# Patient Record
Sex: MALE | Race: WHITE | Employment: FULL TIME | URBAN - METROPOLITAN AREA
[De-identification: names, ages, dates, MRNs, and addresses within clinical notes are randomized per-mention and may not be internally consistent; named-entity substitution may affect disease eponyms.]

---

## 2023-03-27 DIAGNOSIS — E11.9 TYPE 2 DIABETES MELLITUS WITHOUT COMPLICATION, WITHOUT LONG-TERM CURRENT USE OF INSULIN (MULTI): Primary | ICD-10-CM

## 2023-03-27 RX ORDER — ORAL SEMAGLUTIDE 7 MG/1
1 TABLET ORAL DAILY
Qty: 90 TABLET | Refills: 3 | Status: SHIPPED | OUTPATIENT
Start: 2023-03-27 | End: 2023-08-07 | Stop reason: SDUPTHER

## 2023-08-07 DIAGNOSIS — E11.9 TYPE 2 DIABETES MELLITUS WITHOUT COMPLICATION, WITHOUT LONG-TERM CURRENT USE OF INSULIN (MULTI): ICD-10-CM

## 2023-08-07 RX ORDER — ORAL SEMAGLUTIDE 7 MG/1
1 TABLET ORAL DAILY
Qty: 90 TABLET | Refills: 0 | Status: SHIPPED | OUTPATIENT
Start: 2023-08-07 | End: 2023-08-15 | Stop reason: SDUPTHER

## 2023-08-15 DIAGNOSIS — E11.9 TYPE 2 DIABETES MELLITUS WITHOUT COMPLICATION, WITHOUT LONG-TERM CURRENT USE OF INSULIN (MULTI): ICD-10-CM

## 2023-08-15 RX ORDER — ORAL SEMAGLUTIDE 7 MG/1
1 TABLET ORAL DAILY
Qty: 90 TABLET | Refills: 1 | Status: SHIPPED | OUTPATIENT
Start: 2023-08-15 | End: 2023-11-20 | Stop reason: SDUPTHER

## 2023-11-20 ENCOUNTER — TELEPHONE (OUTPATIENT)
Dept: PRIMARY CARE | Facility: CLINIC | Age: 53
End: 2023-11-20

## 2023-11-20 DIAGNOSIS — E11.9 TYPE 2 DIABETES MELLITUS WITHOUT COMPLICATION, WITHOUT LONG-TERM CURRENT USE OF INSULIN (MULTI): ICD-10-CM

## 2023-11-20 RX ORDER — ORAL SEMAGLUTIDE 7 MG/1
1 TABLET ORAL DAILY
Qty: 30 TABLET | Refills: 0 | Status: SHIPPED | OUTPATIENT
Start: 2023-11-20 | End: 2023-11-29 | Stop reason: SDUPTHER

## 2023-11-29 ENCOUNTER — OFFICE VISIT (OUTPATIENT)
Dept: PRIMARY CARE | Facility: CLINIC | Age: 53
End: 2023-11-29
Payer: COMMERCIAL

## 2023-11-29 VITALS
HEART RATE: 92 BPM | HEIGHT: 72 IN | SYSTOLIC BLOOD PRESSURE: 142 MMHG | DIASTOLIC BLOOD PRESSURE: 88 MMHG | OXYGEN SATURATION: 96 % | BODY MASS INDEX: 26.28 KG/M2 | WEIGHT: 194 LBS

## 2023-11-29 DIAGNOSIS — R63.4 WEIGHT LOSS: ICD-10-CM

## 2023-11-29 DIAGNOSIS — I10 PRIMARY HYPERTENSION: ICD-10-CM

## 2023-11-29 DIAGNOSIS — Z12.2 ENCOUNTER FOR SCREENING FOR LUNG CANCER: ICD-10-CM

## 2023-11-29 DIAGNOSIS — E55.9 MILD VITAMIN D DEFICIENCY: ICD-10-CM

## 2023-11-29 DIAGNOSIS — E11.69 TYPE 2 DIABETES MELLITUS WITH OTHER SPECIFIED COMPLICATION, WITHOUT LONG-TERM CURRENT USE OF INSULIN (MULTI): ICD-10-CM

## 2023-11-29 DIAGNOSIS — E11.9 TYPE 2 DIABETES MELLITUS WITHOUT COMPLICATION, WITHOUT LONG-TERM CURRENT USE OF INSULIN (MULTI): Primary | ICD-10-CM

## 2023-11-29 DIAGNOSIS — E78.5 DYSLIPIDEMIA: ICD-10-CM

## 2023-11-29 DIAGNOSIS — Z12.11 COLON CANCER SCREENING: ICD-10-CM

## 2023-11-29 PROCEDURE — 3046F HEMOGLOBIN A1C LEVEL >9.0%: CPT | Performed by: STUDENT IN AN ORGANIZED HEALTH CARE EDUCATION/TRAINING PROGRAM

## 2023-11-29 PROCEDURE — 1036F TOBACCO NON-USER: CPT | Performed by: STUDENT IN AN ORGANIZED HEALTH CARE EDUCATION/TRAINING PROGRAM

## 2023-11-29 PROCEDURE — 99214 OFFICE O/P EST MOD 30 MIN: CPT | Performed by: STUDENT IN AN ORGANIZED HEALTH CARE EDUCATION/TRAINING PROGRAM

## 2023-11-29 PROCEDURE — 3077F SYST BP >= 140 MM HG: CPT | Performed by: STUDENT IN AN ORGANIZED HEALTH CARE EDUCATION/TRAINING PROGRAM

## 2023-11-29 PROCEDURE — 3079F DIAST BP 80-89 MM HG: CPT | Performed by: STUDENT IN AN ORGANIZED HEALTH CARE EDUCATION/TRAINING PROGRAM

## 2023-11-29 PROCEDURE — 4010F ACE/ARB THERAPY RXD/TAKEN: CPT | Performed by: STUDENT IN AN ORGANIZED HEALTH CARE EDUCATION/TRAINING PROGRAM

## 2023-11-29 PROCEDURE — 3049F LDL-C 100-129 MG/DL: CPT | Performed by: STUDENT IN AN ORGANIZED HEALTH CARE EDUCATION/TRAINING PROGRAM

## 2023-11-29 RX ORDER — ATORVASTATIN CALCIUM 20 MG/1
TABLET, FILM COATED ORAL
COMMUNITY
Start: 2019-11-05 | End: 2023-11-29 | Stop reason: SDUPTHER

## 2023-11-29 RX ORDER — ATORVASTATIN CALCIUM 20 MG/1
20 TABLET, FILM COATED ORAL DAILY
Qty: 90 TABLET | Refills: 3 | Status: SHIPPED | OUTPATIENT
Start: 2023-11-29 | End: 2023-12-03 | Stop reason: ALTCHOICE

## 2023-11-29 RX ORDER — ORAL SEMAGLUTIDE 7 MG/1
1 TABLET ORAL DAILY
Qty: 30 TABLET | Refills: 0 | Status: SHIPPED | OUTPATIENT
Start: 2023-11-29 | End: 2023-12-03

## 2023-11-29 RX ORDER — LISINOPRIL 5 MG/1
1 TABLET ORAL DAILY
COMMUNITY
Start: 2018-06-12 | End: 2023-11-29 | Stop reason: SDUPTHER

## 2023-11-29 RX ORDER — LISINOPRIL 5 MG/1
5 TABLET ORAL DAILY
Qty: 90 TABLET | Refills: 3 | Status: SHIPPED | OUTPATIENT
Start: 2023-11-29 | End: 2023-12-03 | Stop reason: ALTCHOICE

## 2023-11-29 NOTE — PROGRESS NOTES
Joe Cruz is a 53 y.o. male [place] [facility] [place] Dam[place] Baig on 11/29/23 for routine care, as well as for management of the following chronic medical conditions: HTN, DLD, T2DM. He presents today for overdue T2DM follow up visit.     UPDATES:   Weight loss on GLP-1: around 194lbs from 217 (over 20 pounds)   Rybelsus no longer covered, unfortunately  Hesitant to pursue injectable but discussed and will hopefully try    ROS only otherwise notable for some lower extremity stiffness   Due for updated/fasting labs, ordered today      #T2DM  - ACE-I: Lisinopril 10mg   - Rybelsus and Glimepiride  [  ] transition to injectable GLP-1, specific pending insurance coverage   - Previously on Metformin but intolerant due to GI side effects   - Last A1C 9 in August 2022 (prior to starting Rybelsus), improved to 7.8%; subsequently increased to 9.2% in setting of him rationing Rybelsus recently   [  ] trialed Ozempic, not covered; Trulicity as alternative   [  ] follow up in 3 months   [  ] encouraged annual podiatry and optho evaluations; prior referrals      #DLD  - Atorva 20   - lipid panel today with , unclear compliance  [  ] uptitrate to 40mg daily   [  ] repeat labs with next A1C      #HTN  - elevated in office today on Lisinopril 5  - increase to Lisinopril 10mg daily   [  ] follow up BP at next visit; further uptitration as needed   - CMP 12/2023 with reassuring renal function     Past Medical History: as above   Past Medical History:   Diagnosis Date    Other specified health status     No pertinent past medical history     Subspecialty Medical Care: none    Past Surgical History: prior ENT surgery (deviated septum)   Past Surgical History:   Procedure Laterality Date    OTHER SURGICAL HISTORY  12/05/2019    No history of surgery     Medications:   Current Outpatient Medications:     atorvastatin (Lipitor) 40 mg tablet, Take 1 tablet (40 mg) by mouth once daily., Disp: 90 tablet, Rfl: 3     dulaglutide (Trulicity) 1.5 mg/0.5 mL pen injector injection, Inject 1.5 mg under the skin 1 (one) time per week., Disp: 2 mL, Rfl: 3    lisinopril 10 mg tablet, Take 1 tablet (10 mg) by mouth once daily., Disp: 90 tablet, Rfl: 3    semaglutide 0.25 mg or 0.5 mg (2 mg/3 mL) pen injector, Inject 0.5 mg under the skin 1 (one) time per week., Disp: 3 mL, Rfl: 2  Pharmacy: [facility]    Allergies: PCN (as child; may consider challenge if necessary)   Allergies   Allergen Reactions    Penicillins Other and Unknown     Unknown reaction     Immunizations:   - Tdap 2017-->2027  - Flu UTD 2023  - COVID booster UTD 2023    - Shingrix recommended but denies ever having varicella as child     Family History:   - Mother with COPD, lifelong smoker   - Father with CAD   - Brother with HEENT cancer, smoking/alcohol use   No family history on file.    Social History:   Home/Living Situation/Falls/Safety Assessment: lives alone   Education/Employment/Work/Vocational:   Activities: had been working out regularly in the past   Drug Use: former smoker (quit in 2008, from age [age])  Diet: dietary counseling discussed; mostly eats out   Depression/Anxiety: no mood concerns   Sexuality/Contraception/Menstrual History: defers   Sleep: no sleep concerns, rises at 4:45AM daily      Patient Information:  Health Insurance: has insurance   Transportation: [facility] POA/Guardian: emergency contact, brother [phone]  Contact Information: [phone]    Visit Vitals  /88   Pulse 92   Ht 1.829 m (6')   Wt 88 kg (194 lb)   SpO2 96%   BMI 26.31 kg/m²   Smoking Status Never   BSA 2.11 m²      PHYSICAL EXAM:   General: well appearing  male, NAD, pleasant and engaged in encounter    HEENT: NCAT, MMM  CV: RRR, no m/r/g  PULM: CTAB, non-labored respirations   ABD: soft, NT, ND, + bowel sounds   : no suprapubic or CVA tenderness   EXT: WWP, no significant edema   SKIN: no rashes noted   NEURO: A&Ox4, symmetric facies, no  gross motor or sensory deficits, normal gait  PSYCH: pleasant mood, appropriate affect     Assessment/Plan    Joe Cruz is a 53 y.o. male seen in Clinic at [facility] [place]. Damon Baig on 11/29/23 for routine care, as well as for management of the following chronic medical conditions: HTN, DLD, T2DM. He presents today for overdue T2DM follow up visit.     UPDATES:   Weight loss on GLP-1: around 194lbs from 217 (over 20 pounds)   Rybelsus no longer covered, unfortunately  Hesitant to pursue injectable but discussed and will hopefully try    ROS only otherwise notable for some lower extremity stiffness   Due for updated/fasting labs, ordered today      #T2DM  - ACE-I: Lisinopril 10mg   - Rybelsus and Glimepiride  [  ] transition to injectable GLP-1, specific pending insurance coverage   - Previously on Metformin but intolerant due to GI side effects   - Last A1C 9 in August 2022 (prior to starting Rybelsus), improved to 7.8%; subsequently increased to 9.2% in setting of him rationing Rybelsus recently   [  ] trialed Ozempic, not covered; Trulicity as alternative   [  ] follow up in 3 months   [  ] encouraged annual podiatry and optho evaluations; prior referrals      #DLD  - Atorva 20   - lipid panel today with , unclear compliance  [  ] uptitrate to 40mg daily   [  ] repeat labs with next A1C      #HTN  - elevated in office today on Lisinopril 5  - increase to Lisinopril 10mg daily   [  ] follow up BP at next visit; further uptitration as needed   - CMP 12/2023 with reassuring renal function      #Health Maintenance     Cancer Screening  - Colorectal Cancer Screening: YAMINI recommended and ordered today   - Lung Cancer Screening: ordered today given ~25 pack year smoking history   - Prostate Cancer Screening: labs at 53     Laboratory Screening  - Lipid Screen: labs today (on statin)   - A1C, glucose screen: labs today   - STI, HIV, Hep B screen: defers  - Hep C screen: defers     Imaging  Screening  - AAA screening: due at 65      Immunizations  - Influenza: UTD 2023  - COVID: UTD 2023   - Tdap: UTD through 2027  - Prevnar, Pneumovax:   - Shingrix: recommended      Other Screening  - Health Literacy Assessment: adequate  - Depression screen: NEGATIVE   - Home safety/partner violence screen: NEGATIVE   - Hearing/Vision screens: optho recommended given T2DM   - Alcohol/tobacco/drug use screen: former smoker   - Healthcare POA/Advanced Directives: Brother      Referrals: medication refills (uptitration of Lisinopril and Atorvastatin given not at goal; transition from Rybelsus to Trulicity), labs, Cologuard, CT lung cancer screening    Return to clinic in 3 months for follow-up of suboptimally controlled T2DM, DLD, and HTN.     Patient Discussion:    Please call back the office with any questions at [phone]. In the case of an emergency, please call [phone] or go to the nearest Emergency Department.      Damon Baig MD  Internal Medicine-Pediatrics  [facility] [place] [address], [address]  P: [phone], F: [phone]

## 2023-12-02 ENCOUNTER — LAB (OUTPATIENT)
Dept: LAB | Facility: LAB | Age: 53
End: 2023-12-02
Payer: COMMERCIAL

## 2023-12-02 DIAGNOSIS — E55.9 MILD VITAMIN D DEFICIENCY: ICD-10-CM

## 2023-12-02 DIAGNOSIS — E78.5 DYSLIPIDEMIA: ICD-10-CM

## 2023-12-02 DIAGNOSIS — E11.9 TYPE 2 DIABETES MELLITUS WITHOUT COMPLICATION, WITHOUT LONG-TERM CURRENT USE OF INSULIN (MULTI): ICD-10-CM

## 2023-12-02 DIAGNOSIS — R63.4 WEIGHT LOSS: ICD-10-CM

## 2023-12-02 LAB
25(OH)D3 SERPL-MCNC: 6 NG/ML (ref 30–100)
ALBUMIN SERPL BCP-MCNC: 4.5 G/DL (ref 3.4–5)
ALP SERPL-CCNC: 93 U/L (ref 33–120)
ALT SERPL W P-5'-P-CCNC: 12 U/L (ref 10–52)
ANION GAP SERPL CALC-SCNC: 11 MMOL/L (ref 10–20)
AST SERPL W P-5'-P-CCNC: 9 U/L (ref 9–39)
BASOPHILS # BLD AUTO: 0.06 X10*3/UL (ref 0–0.1)
BASOPHILS NFR BLD AUTO: 0.7 %
BILIRUB SERPL-MCNC: 1.4 MG/DL (ref 0–1.2)
BUN SERPL-MCNC: 13 MG/DL (ref 6–23)
CALCIUM SERPL-MCNC: 9.5 MG/DL (ref 8.6–10.6)
CHLORIDE SERPL-SCNC: 104 MMOL/L (ref 98–107)
CHOLEST SERPL-MCNC: 172 MG/DL (ref 0–199)
CHOLESTEROL/HDL RATIO: 3.9
CO2 SERPL-SCNC: 31 MMOL/L (ref 21–32)
CREAT SERPL-MCNC: 0.75 MG/DL (ref 0.5–1.3)
EOSINOPHIL # BLD AUTO: 0.34 X10*3/UL (ref 0–0.7)
EOSINOPHIL NFR BLD AUTO: 4 %
ERYTHROCYTE [DISTWIDTH] IN BLOOD BY AUTOMATED COUNT: 12.5 % (ref 11.5–14.5)
ERYTHROCYTE [SEDIMENTATION RATE] IN BLOOD BY WESTERGREN METHOD: <1 MM/H (ref 0–20)
EST. AVERAGE GLUCOSE BLD GHB EST-MCNC: 217 MG/DL
FERRITIN SERPL-MCNC: 197 NG/ML (ref 20–300)
FOLATE SERPL-MCNC: 11.2 NG/ML
GFR SERPL CREATININE-BSD FRML MDRD: >90 ML/MIN/1.73M*2
GLUCOSE SERPL-MCNC: 212 MG/DL (ref 74–99)
HBA1C MFR BLD: 9.2 %
HBV SURFACE AG SERPL QL IA: NONREACTIVE
HCT VFR BLD AUTO: 50.6 % (ref 41–52)
HCV AB SER QL: NONREACTIVE
HDLC SERPL-MCNC: 44.4 MG/DL
HGB BLD-MCNC: 17.3 G/DL (ref 13.5–17.5)
HIV 1+2 AB+HIV1 P24 AG SERPL QL IA: NONREACTIVE
IMM GRANULOCYTES # BLD AUTO: 0.02 X10*3/UL (ref 0–0.7)
IMM GRANULOCYTES NFR BLD AUTO: 0.2 % (ref 0–0.9)
IRON SATN MFR SERPL: 32 %
IRON SERPL-MCNC: 104 UG/DL (ref 35–150)
LDLC SERPL CALC-MCNC: 108 MG/DL
LYMPHOCYTES # BLD AUTO: 2.47 X10*3/UL (ref 1.2–4.8)
LYMPHOCYTES NFR BLD AUTO: 29 %
MCH RBC QN AUTO: 29.5 PG (ref 26–34)
MCHC RBC AUTO-ENTMCNC: 34.2 G/DL (ref 32–36)
MCV RBC AUTO: 86 FL (ref 80–100)
MONOCYTES # BLD AUTO: 0.64 X10*3/UL (ref 0.1–1)
MONOCYTES NFR BLD AUTO: 7.5 %
NEUTROPHILS # BLD AUTO: 5 X10*3/UL (ref 1.2–7.7)
NEUTROPHILS NFR BLD AUTO: 58.6 %
NON HDL CHOLESTEROL: 128 MG/DL (ref 0–149)
NRBC BLD-RTO: 0 /100 WBCS (ref 0–0)
PLATELET # BLD AUTO: 275 X10*3/UL (ref 150–450)
POTASSIUM SERPL-SCNC: 4.5 MMOL/L (ref 3.5–5.3)
PROT SERPL-MCNC: 6.9 G/DL (ref 6.4–8.2)
RBC # BLD AUTO: 5.86 X10*6/UL (ref 4.5–5.9)
SODIUM SERPL-SCNC: 141 MMOL/L (ref 136–145)
TIBC SERPL-MCNC: 326 UG/DL
TRIGL SERPL-MCNC: 97 MG/DL (ref 0–149)
TSH SERPL-ACNC: 0.53 MIU/L (ref 0.44–3.98)
UIBC SERPL-MCNC: 222 UG/DL (ref 110–370)
VIT B12 SERPL-MCNC: 298 PG/ML (ref 211–911)
VLDL: 19 MG/DL (ref 0–40)
WBC # BLD AUTO: 8.5 X10*3/UL (ref 4.4–11.3)

## 2023-12-02 PROCEDURE — 85652 RBC SED RATE AUTOMATED: CPT

## 2023-12-02 PROCEDURE — 80053 COMPREHEN METABOLIC PANEL: CPT

## 2023-12-02 PROCEDURE — 83036 HEMOGLOBIN GLYCOSYLATED A1C: CPT

## 2023-12-02 PROCEDURE — 82306 VITAMIN D 25 HYDROXY: CPT

## 2023-12-02 PROCEDURE — 84443 ASSAY THYROID STIM HORMONE: CPT

## 2023-12-02 PROCEDURE — 82746 ASSAY OF FOLIC ACID SERUM: CPT

## 2023-12-02 PROCEDURE — 87340 HEPATITIS B SURFACE AG IA: CPT

## 2023-12-02 PROCEDURE — 82607 VITAMIN B-12: CPT

## 2023-12-02 PROCEDURE — 83540 ASSAY OF IRON: CPT

## 2023-12-02 PROCEDURE — 83550 IRON BINDING TEST: CPT

## 2023-12-02 PROCEDURE — 87389 HIV-1 AG W/HIV-1&-2 AB AG IA: CPT

## 2023-12-02 PROCEDURE — 85025 COMPLETE CBC W/AUTO DIFF WBC: CPT

## 2023-12-02 PROCEDURE — 36415 COLL VENOUS BLD VENIPUNCTURE: CPT

## 2023-12-02 PROCEDURE — 82728 ASSAY OF FERRITIN: CPT

## 2023-12-02 PROCEDURE — 80061 LIPID PANEL: CPT

## 2023-12-02 PROCEDURE — 86803 HEPATITIS C AB TEST: CPT

## 2023-12-03 DIAGNOSIS — I10 PRIMARY HYPERTENSION: ICD-10-CM

## 2023-12-03 DIAGNOSIS — E11.9 TYPE 2 DIABETES MELLITUS WITHOUT COMPLICATION, WITHOUT LONG-TERM CURRENT USE OF INSULIN (MULTI): Primary | ICD-10-CM

## 2023-12-03 DIAGNOSIS — E11.9 TYPE 2 DIABETES MELLITUS WITHOUT COMPLICATION, WITHOUT LONG-TERM CURRENT USE OF INSULIN (MULTI): ICD-10-CM

## 2023-12-03 DIAGNOSIS — E78.5 DYSLIPIDEMIA: Primary | ICD-10-CM

## 2023-12-03 RX ORDER — ATORVASTATIN CALCIUM 40 MG/1
40 TABLET, FILM COATED ORAL DAILY
Qty: 90 TABLET | Refills: 3 | Status: SHIPPED | OUTPATIENT
Start: 2023-12-03 | End: 2023-12-28 | Stop reason: SDUPTHER

## 2023-12-03 RX ORDER — LISINOPRIL 10 MG/1
10 TABLET ORAL DAILY
Qty: 90 TABLET | Refills: 3 | Status: SHIPPED | OUTPATIENT
Start: 2023-12-03 | End: 2023-12-28 | Stop reason: SDUPTHER

## 2023-12-04 NOTE — PROGRESS NOTES
T2DM with worsening control; patient resistant to transitioning to injectable GLP-1 from Rybelsus. Pharmacy referral for ongoing support and discussion.     Damon Baig MD

## 2023-12-05 ENCOUNTER — TELEPHONE (OUTPATIENT)
Dept: PRIMARY CARE | Facility: CLINIC | Age: 53
End: 2023-12-05

## 2023-12-12 DIAGNOSIS — E11.9 TYPE 2 DIABETES MELLITUS WITHOUT COMPLICATION, WITHOUT LONG-TERM CURRENT USE OF INSULIN (MULTI): Primary | ICD-10-CM

## 2023-12-13 RX ORDER — DULAGLUTIDE 1.5 MG/.5ML
1.5 INJECTION, SOLUTION SUBCUTANEOUS
Qty: 2 ML | Refills: 11 | Status: SHIPPED | OUTPATIENT
Start: 2023-12-13 | End: 2023-12-15 | Stop reason: SDUPTHER

## 2023-12-15 DIAGNOSIS — E11.9 TYPE 2 DIABETES MELLITUS WITHOUT COMPLICATION, WITHOUT LONG-TERM CURRENT USE OF INSULIN (MULTI): ICD-10-CM

## 2023-12-15 RX ORDER — DULAGLUTIDE 1.5 MG/.5ML
1.5 INJECTION, SOLUTION SUBCUTANEOUS
Qty: 2 ML | Refills: 3 | Status: SHIPPED | OUTPATIENT
Start: 2023-12-15 | End: 2023-12-28 | Stop reason: SDUPTHER

## 2023-12-28 ENCOUNTER — TELEMEDICINE (OUTPATIENT)
Dept: PHARMACY | Facility: HOSPITAL | Age: 53
End: 2023-12-28
Payer: COMMERCIAL

## 2023-12-28 DIAGNOSIS — E11.9 TYPE 2 DIABETES MELLITUS WITHOUT COMPLICATION, WITHOUT LONG-TERM CURRENT USE OF INSULIN (MULTI): ICD-10-CM

## 2023-12-28 DIAGNOSIS — I10 PRIMARY HYPERTENSION: ICD-10-CM

## 2023-12-28 DIAGNOSIS — E78.5 DYSLIPIDEMIA: ICD-10-CM

## 2023-12-28 RX ORDER — ATORVASTATIN CALCIUM 40 MG/1
40 TABLET, FILM COATED ORAL DAILY
Qty: 90 TABLET | Refills: 3 | Status: SHIPPED | OUTPATIENT
Start: 2023-12-28 | End: 2024-12-22

## 2023-12-28 RX ORDER — LISINOPRIL 10 MG/1
10 TABLET ORAL DAILY
Qty: 90 TABLET | Refills: 3 | Status: SHIPPED | OUTPATIENT
Start: 2023-12-28 | End: 2024-12-22

## 2023-12-28 RX ORDER — DULAGLUTIDE 1.5 MG/.5ML
1.5 INJECTION, SOLUTION SUBCUTANEOUS
Qty: 6 ML | Refills: 1 | Status: SHIPPED | OUTPATIENT
Start: 2023-12-28 | End: 2024-02-22 | Stop reason: DRUGHIGH

## 2023-12-28 NOTE — PROGRESS NOTES
Subjective     Patient ID: Joe Cruz is a 53 y.o. male who presents for Diabetes.    Referring Provider: Damon Baig MD     Diabetes  He presents for his initial diabetic visit. He has type 2 diabetes mellitus. His disease course has been worsening.       Allergies   Allergen Reactions    Penicillins Other and Unknown     Unknown reaction       Objective     Current DM Pharmacotherapy:   Trulicity 1.5 mg/0.5 mL - once weekly (took 1st dose yesterday)    SECONDARY PREVENTION  - Statin? Yes  - ACE-I/ARB? Yes  - Aspirin? No      Pertinent PMH Review:  - PMH of Pancreatitis: No  - PMH/FH of Medullary Thyroid Cancer: No  - PMH of Retinopathy: No  - PMH of Urinary Tract Infections: No    Lab Review  Lab Results   Component Value Date    BILITOT 1.4 (H) 12/02/2023    CALCIUM 9.5 12/02/2023    CO2 31 12/02/2023     12/02/2023    CREATININE 0.75 12/02/2023    GLUCOSE 212 (H) 12/02/2023    ALKPHOS 93 12/02/2023    K 4.5 12/02/2023    PROT 6.9 12/02/2023     12/02/2023    AST 9 12/02/2023    ALT 12 12/02/2023    BUN 13 12/02/2023    ANIONGAP 11 12/02/2023    ALBUMIN 4.5 12/02/2023    GFRMALE >90 12/01/2022     Lab Results   Component Value Date    TRIG 97 12/02/2023    CHOL 172 12/02/2023    LDLCALC 108 (H) 12/02/2023    HDL 44.4 12/02/2023     Lab Results   Component Value Date    HGBA1C 9.2 (H) 12/02/2023     The 10-year ASCVD risk score (Kasey CHRISTIAN, et al., 2019) is: 13.6%    Values used to calculate the score:      Age: 53 years      Sex: Male      Is Non- : No      Diabetic: Yes      Tobacco smoker: No      Systolic Blood Pressure: 142 mmHg      Is BP treated: Yes      HDL Cholesterol: 44.4 mg/dL      Total Cholesterol: 172 mg/dL      Assessment/Plan     Problem List Items Addressed This Visit       Type 2 diabetes mellitus without complication, without long-term current use of insulin (CMS/[facility])     CONTINUE  Trulicity 1.5 mg/0.5 mL - once weekly           Trulicity  Education:    - Counseled patient on Trulicity MOA, expectations, side effects, duration of therapy, administration, and monitoring parameters.  - Provided detailed dosing and administration counseling to ensure proper technique.   - Reviewed Trulicity titration schedule, starting with 0.75 mg once weekly to 1.5 mg, 3 mg, and if tolerated 4.5 mg.  - Counseled patient on the benefits of GLP-1ra, such as cardiovascular risk reduction, glycemic control, and weight loss potential.  - Reviewed storage requirements of Trulicity when not in use, and when to administer the medication if a dose is missed.  - Advised patient that they may experience improved satiety after meals and portion sizes of meals may be reduced as doses of Trulicity increase.    Type 2 diabetes mellitus, is not at goal. Goal A1C: <7%    Follow up: I recommend diabetes care be as needed in 3 months   Patient would prefer to utilize [facility] as primary [facility], will send 90 day supplies to Vini Garcia PharmD [facility]  Clinical Pharmacy Specialist, Primary Care     Continue all meds under the continuation of care with the referring provider and clinical pharmacy team

## 2023-12-29 NOTE — PATIENT INSTRUCTIONS
FASTING labs in [address] or [address] in our building. Lab is open on Saturday mornings.     Medication refill for the Rybelsus today.    LISINOPRIL for your high blood pressure  ATORVASTATIN for your high cholesterol    CT scan ordered and STOOL TESTING (COLOGUARD) for colon cancer screening  Call [phone] to coordinate these     Best,   Dr. Baig  
denies street drugs

## 2024-01-26 ENCOUNTER — PHARMACY VISIT (OUTPATIENT)
Dept: PHARMACY | Facility: CLINIC | Age: 54
End: 2024-01-26
Payer: COMMERCIAL

## 2024-01-26 PROCEDURE — RXMED WILLOW AMBULATORY MEDICATION CHARGE

## 2024-02-22 ENCOUNTER — TELEMEDICINE (OUTPATIENT)
Dept: PHARMACY | Facility: HOSPITAL | Age: 54
End: 2024-02-22
Payer: COMMERCIAL

## 2024-02-22 DIAGNOSIS — E11.9 TYPE 2 DIABETES MELLITUS WITHOUT COMPLICATION, WITHOUT LONG-TERM CURRENT USE OF INSULIN (MULTI): Primary | ICD-10-CM

## 2024-02-22 DIAGNOSIS — E11.9 TYPE 2 DIABETES MELLITUS WITHOUT COMPLICATION, WITHOUT LONG-TERM CURRENT USE OF INSULIN (MULTI): ICD-10-CM

## 2024-02-22 NOTE — PROGRESS NOTES
Subjective     Patient ID: Joe Cruz is a 53 y.o. male who presents for Diabetes.    Referring Provider: Damon Baig MD     Diabetes  He presents for his follow-up diabetic visit. He has type 2 diabetes mellitus. His disease course has been worsening.     Some GI upset with Trulicity but finds it manageable. Not testing BG at home.       Allergies   Allergen Reactions    Penicillins Other and Unknown     Unknown reaction       Objective     Current DM Pharmacotherapy:   Trulicity 1.5 mg/0.5 mL - once weekly    SECONDARY PREVENTION  - Statin? Yes  - ACE-I/ARB? Yes  - Aspirin? No      Pertinent PMH Review:  - PMH of Pancreatitis: No  - PMH/FH of Medullary Thyroid Cancer: No  - PMH of Retinopathy: No  - PMH of Urinary Tract Infections: No    Lab Review  Lab Results   Component Value Date    BILITOT 1.4 (H) 12/02/2023    CALCIUM 9.5 12/02/2023    CO2 31 12/02/2023     12/02/2023    CREATININE 0.75 12/02/2023    GLUCOSE 212 (H) 12/02/2023    ALKPHOS 93 12/02/2023    K 4.5 12/02/2023    PROT 6.9 12/02/2023     12/02/2023    AST 9 12/02/2023    ALT 12 12/02/2023    BUN 13 12/02/2023    ANIONGAP 11 12/02/2023    ALBUMIN 4.5 12/02/2023    GFRMALE >90 12/01/2022     Lab Results   Component Value Date    TRIG 97 12/02/2023    CHOL 172 12/02/2023    LDLCALC 108 (H) 12/02/2023    HDL 44.4 12/02/2023     Lab Results   Component Value Date    HGBA1C 9.2 (H) 12/02/2023     The 10-year ASCVD risk score (Kasey CHRISTIAN, et al., 2019) is: 13.6%    Values used to calculate the score:      Age: 53 years      Sex: Male      Is Non- : No      Diabetic: Yes      Tobacco smoker: No      Systolic Blood Pressure: 142 mmHg      Is BP treated: Yes      HDL Cholesterol: 44.4 mg/dL      Total Cholesterol: 172 mg/dL      Assessment/Plan     Problem List Items Addressed This Visit       Type 2 diabetes mellitus without complication, without long-term current use of insulin (CMS/[facility])     Trulicity 1.5 mg  on backorder, patient would like to increase dose.     Increase to Trulicity 3 mg/0.5 mL - once weekly           Patient is strongly advised to schedule follow-up with PCP and obtain updated lab work on or after 3/2/24    Type 2 diabetes mellitus, is not at goal. Goal A1C: <7%    Follow up: I recommend diabetes care be as needed Patient would prefer to utilize [facility] as primary pharmacy, will send 90 day supplies to [facility]     Laurita PeñaD [facility]  Clinical Pharmacy Specialist, Primary Care     Continue all meds under the continuation of care with the referring provider and clinical pharmacy team

## 2024-02-22 NOTE — ASSESSMENT & PLAN NOTE
Trulicity 1.5 mg on backorder, patient would like to increase dose.     Increase to Trulicity 3 mg/0.5 mL - once weekly

## 2024-02-28 ENCOUNTER — TELEPHONE (OUTPATIENT)
Dept: PRIMARY CARE | Facility: CLINIC | Age: 54
End: 2024-02-28

## 2024-03-07 ENCOUNTER — OFFICE VISIT (OUTPATIENT)
Dept: PRIMARY CARE | Facility: CLINIC | Age: 54
End: 2024-03-07
Payer: COMMERCIAL

## 2024-03-07 ENCOUNTER — LAB (OUTPATIENT)
Dept: LAB | Facility: LAB | Age: 54
End: 2024-03-07
Payer: COMMERCIAL

## 2024-03-07 VITALS
WEIGHT: 200 LBS | OXYGEN SATURATION: 96 % | BODY MASS INDEX: 27.12 KG/M2 | HEART RATE: 89 BPM | DIASTOLIC BLOOD PRESSURE: 78 MMHG | SYSTOLIC BLOOD PRESSURE: 120 MMHG

## 2024-03-07 DIAGNOSIS — D17.9 LIPOMA, UNSPECIFIED SITE: ICD-10-CM

## 2024-03-07 DIAGNOSIS — E11.9 TYPE 2 DIABETES MELLITUS WITHOUT COMPLICATION, WITHOUT LONG-TERM CURRENT USE OF INSULIN (MULTI): Primary | ICD-10-CM

## 2024-03-07 DIAGNOSIS — E11.9 TYPE 2 DIABETES MELLITUS WITHOUT COMPLICATION, WITHOUT LONG-TERM CURRENT USE OF INSULIN (MULTI): ICD-10-CM

## 2024-03-07 DIAGNOSIS — I10 PRIMARY HYPERTENSION: ICD-10-CM

## 2024-03-07 DIAGNOSIS — E78.5 DYSLIPIDEMIA: ICD-10-CM

## 2024-03-07 LAB
ALBUMIN SERPL BCP-MCNC: 4.6 G/DL (ref 3.4–5)
ALP SERPL-CCNC: 85 U/L (ref 33–120)
ALT SERPL W P-5'-P-CCNC: 25 U/L (ref 10–52)
ANION GAP SERPL CALC-SCNC: 14 MMOL/L (ref 10–20)
AST SERPL W P-5'-P-CCNC: 13 U/L (ref 9–39)
BILIRUB SERPL-MCNC: 1.3 MG/DL (ref 0–1.2)
BUN SERPL-MCNC: 13 MG/DL (ref 6–23)
CALCIUM SERPL-MCNC: 10 MG/DL (ref 8.6–10.6)
CHLORIDE SERPL-SCNC: 103 MMOL/L (ref 98–107)
CO2 SERPL-SCNC: 30 MMOL/L (ref 21–32)
CREAT SERPL-MCNC: 0.91 MG/DL (ref 0.5–1.3)
EGFRCR SERPLBLD CKD-EPI 2021: >90 ML/MIN/1.73M*2
EST. AVERAGE GLUCOSE BLD GHB EST-MCNC: 240 MG/DL
GLUCOSE SERPL-MCNC: 284 MG/DL (ref 74–99)
HBA1C MFR BLD: 10 %
POTASSIUM SERPL-SCNC: 4.6 MMOL/L (ref 3.5–5.3)
PROT SERPL-MCNC: 6.8 G/DL (ref 6.4–8.2)
SODIUM SERPL-SCNC: 142 MMOL/L (ref 136–145)

## 2024-03-07 PROCEDURE — 36415 COLL VENOUS BLD VENIPUNCTURE: CPT

## 2024-03-07 PROCEDURE — 83036 HEMOGLOBIN GLYCOSYLATED A1C: CPT

## 2024-03-07 PROCEDURE — 3074F SYST BP LT 130 MM HG: CPT | Performed by: STUDENT IN AN ORGANIZED HEALTH CARE EDUCATION/TRAINING PROGRAM

## 2024-03-07 PROCEDURE — 1036F TOBACCO NON-USER: CPT | Performed by: STUDENT IN AN ORGANIZED HEALTH CARE EDUCATION/TRAINING PROGRAM

## 2024-03-07 PROCEDURE — 80053 COMPREHEN METABOLIC PANEL: CPT

## 2024-03-07 PROCEDURE — 4010F ACE/ARB THERAPY RXD/TAKEN: CPT | Performed by: STUDENT IN AN ORGANIZED HEALTH CARE EDUCATION/TRAINING PROGRAM

## 2024-03-07 PROCEDURE — 3078F DIAST BP <80 MM HG: CPT | Performed by: STUDENT IN AN ORGANIZED HEALTH CARE EDUCATION/TRAINING PROGRAM

## 2024-03-07 PROCEDURE — 99213 OFFICE O/P EST LOW 20 MIN: CPT | Performed by: STUDENT IN AN ORGANIZED HEALTH CARE EDUCATION/TRAINING PROGRAM

## 2024-03-07 RX ORDER — ATORVASTATIN CALCIUM 20 MG/1
20 TABLET, FILM COATED ORAL DAILY
COMMUNITY
Start: 2023-12-03 | End: 2024-03-07 | Stop reason: ALTCHOICE

## 2024-03-07 RX ORDER — LISINOPRIL 5 MG/1
5 TABLET ORAL DAILY
COMMUNITY
Start: 2023-12-03 | End: 2024-03-07 | Stop reason: ALTCHOICE

## 2024-03-07 NOTE — PROGRESS NOTES
Joe Cruz is a 53 y.o. male [place] [facility] [place] Damon Baig on 03/07/24 for routine care, as well as for management of the following chronic medical conditions: HTN, DLD, T2DM. He presents today for overdue T2DM follow up visit.     UPDATES:   Weight loss on GLP-1: around 194lbs from 217 (over 20 pounds), now up to 200 since transition off Rybelsus   Transitioned to Trulicity, though lapse in coverage at present, has been off for last 2 weeks   Tolerating injection well, last on 1.5mg weekly dosing with plans to go up to 3mg daily   Agreeable to updated labs today   Notes recurrent soft tissue lesion (possible lipoma vs. Epidermoid cyst) on back for which he would like to follow up with general surgery to discuss excision   Has NOT yet increased Statin dosage, wanting to use old supply; told him he can take two tablets together and then transition to new tablets  BP better controlled on increased dose ACE-I   ROS only otherwise notable for some R hand stiffness/pain; likely overuse, he is a    Due for updated T2DM labs today     #T2DM  - ACE-I: Lisinopril 10mg   - prior Rybelsus and Glimepiride  [  ] transitioned to injectable GLP-1, Trulicity, most recently on 1.5mg dosing; plan to increase to 3mg but off for last 2 weeks in setting of copay card outage   - Previously on Metformin but intolerant due to GI side effects   - Last A1C 9.2 in 12/2023  [  ] labs today   [  ] following with pharmacy   [  ] follow up in 3 months   [  ] if unable to get Trulicity in new few weeks, will have to adjust plan; discuss with pharmacy (possibly re-introduce sulfonylurea in short term)   [  ] encouraged annual podiatry and optho evaluations; prior referrals      #DLD  - Atorva 40 (though has not yet increased dose)   - lipid panel today with , unclear compliance  [  ] repeat labs after actually has uptitrated      #HTN  - improved control on 10mg daily dosing   [  ] follow up BP at next visit;  further uptitration as needed   - CMP 12/2023 with reassuring renal function     Past Medical History: as above   Past Medical History:   Diagnosis Date    Other specified health status     No pertinent past medical history     Subspecialty Medical Care: none    Past Surgical History: prior ENT surgery (deviated septum)   Past Surgical History:   Procedure Laterality Date    OTHER SURGICAL HISTORY  12/05/2019    No history of surgery     Medications:   Current Outpatient Medications:     atorvastatin (Lipitor) 40 mg tablet, Take 1 tablet (40 mg) by mouth once daily., Disp: 90 tablet, Rfl: 3    dulaglutide 3 mg/0.5 mL pen injector, Inject 3 mg under the skin 1 (one) time per week., Disp: 6 mL, Rfl: 1    lisinopril 10 mg tablet, Take 1 tablet (10 mg) by mouth once daily., Disp: 90 tablet, Rfl: 3  Pharmacy: [facility]    Allergies: PCN (as child; may consider challenge if necessary)   Allergies   Allergen Reactions    Penicillins Other and Unknown     Unknown reaction     Immunizations:   - Tdap 2017-->2027  - Flu UTD 2023  - COVID booster UTD 2023    - Shingrix recommended but denies ever having varicella as child     Family History:   - Mother with COPD, lifelong smoker   - Father with CAD   - Brother with HEENT cancer, smoking/alcohol use   No family history on file.    Social History:   Home/Living Situation/Falls/Safety Assessment: lives alone   Education/Employment/Work/Vocational:   Activities: had been working out regularly in the past   Drug Use: former smoker (quit in 2008, from age [age])  Diet: dietary counseling discussed; mostly eats out   Depression/Anxiety: no mood concerns   Sexuality/Contraception/Menstrual History: defers   Sleep: no sleep concerns, rises at 4:45AM daily      Patient Information:  Health Insurance: has insurance   Transportation: drives   Healthcare POA/Guardian: emergency contact, brother [phone]  Contact Information: [phone]    Visit Vitals  /78   Pulse 89   Wt  90.7 kg (200 lb)   SpO2 96%   BMI 27.12 kg/m²   Smoking Status Never   BSA 2.15 m²      PHYSICAL EXAM:   General: well appearing  male, NAD, pleasant and engaged in encounter    HEENT: NCAT, MMM  CV: RRR, no m/r/g  PULM: CTAB, non-labored respirations   ABD: soft, NT, ND, + bowel sounds   : no suprapubic or CVA tenderness   EXT: WWP, no significant edema   SKIN: no rashes noted   NEURO: A&Ox4, symmetric facies, no gross motor or sensory deficits, normal gait  PSYCH: pleasant mood, appropriate affect     Assessment/Plan    Joe Cruz is a 53 y.o. male [place] [facility] [place]ig on 03/07/24 for routine care, as well as for management of the following chronic medical conditions: TN, DLD, T2DM. He presents today for overdue T2DM follow up visit.     UPDATES:   Weight loss on GLP-1: around 194lbs from 217 (over 20 pounds), now up to 200 since transition off Rybelsus   Transitioned to Trulicity, though lapse in coverage at present, has been off for last 2 weeks   Tolerating injection well, last on 1.5mg weekly dosing with plans to go up to 3mg daily   Agreeable to updated labs today   Notes recurrent soft tissue lesion (possible lipoma vs. Epidermoid cyst) on back for which he would like to follow up with general surgery to discuss excision   Has NOT yet increased Statin dosage, wanting to use old supply; told him he can take two tablets together and then transition to new tablets  BP better controlled on increased dose ACE-I   ROS only otherwise notable for some R hand stiffness/pain; likely overuse, he is a    Due for updated T2DM labs today     #T2DM  - ACE-I: Lisinopril 10mg   - prior Rybelsus and Glimepiride  [  ] transitioned to injectable GLP-1, Trulicity, most recently on 1.5mg dosing; plan to increase to 3mg but off for last 2 weeks in setting of copay card outage   - Previously on Metformin but intolerant due to GI side effects   - Last A1C 9.2 in 12/2023  [  ] labs  today   [  ] following with pharmacy   [  ] follow up in 3 months   [  ] if unable to get Trulicity in new few weeks, will have to adjust plan; discuss with pharmacy (possibly re-introduce sulfonylurea in short term)   [  ] encouraged annual podiatry and optho evaluations; prior referrals      #DLD  - Atorva 40 (though has not yet increased dose)   - lipid panel today with , unclear compliance  [  ] repeat labs after actually has uptitrated      #HTN  - improved control on 10mg daily dosing   [  ] follow up BP at next visit; further uptitration as needed   - CMP 12/2023 with reassuring renal function      #Health Maintenance     Cancer Screening  - Colorectal Cancer Screening: COLOGUARD recommended again, reminded   - Lung Cancer Screening: ordered at CPE but not yet completed   - Prostate Cancer Screening: labs at 53     Laboratory Screening  - Lipid Screen: above goal; repeat when actually increases to 40mg dosing   - A1C, glucose screen: labs today   - STI, HIV, Hep B screen: defers  - Hep C screen: defers     Imaging Screening  - AAA screening: due at 65      Immunizations  - Influenza: UTD 2023  - COVID: UTD 2023   - Tdap: UTD through 2027  - Prevnar, Pneumovax:   - Shingrix: recommended      Other Screening  - Health Literacy Assessment: adequate  - Depression screen: NEGATIVE   - Home safety/partner violence screen: NEGATIVE   - Hearing/Vision screens: optho recommended given T2DM   - Alcohol/tobacco/drug use screen: former smoker   - Healthcare POA/Advanced Directives: Brother      Referrals: labs, general surgery, follow up with pharmacy    Return to clinic in 3 months for follow-up of suboptimally controlled T2DM, DLD, and HTN.     Patient Discussion:    Please call back the office with any questions at [phone]. In the case of an emergency, please call [phone] or go to the nearest Emergency Department.      Damon Baig MD  Internal Medicine-Pediatrics  [facility] [place] [address]  P: [phone], F: [phone]

## 2024-03-11 RX ORDER — BLOOD-GLUCOSE SENSOR
EACH MISCELLANEOUS
Qty: 3 EACH | Refills: 3 | Status: SHIPPED | OUTPATIENT
Start: 2024-03-11

## 2024-03-11 RX ORDER — INSULIN GLARGINE-YFGN 100 [IU]/ML
15 INJECTION, SOLUTION SUBCUTANEOUS NIGHTLY
Qty: 15 ML | Refills: 6 | Status: SHIPPED | OUTPATIENT
Start: 2024-03-11 | End: 2024-07-29

## 2024-03-11 RX ORDER — BLOOD-GLUCOSE,RECEIVER,CONT
EACH MISCELLANEOUS
Qty: 1 EACH | Refills: 0 | Status: SHIPPED | OUTPATIENT
Start: 2024-03-11

## 2024-03-12 DIAGNOSIS — E11.9 TYPE 2 DIABETES MELLITUS WITHOUT COMPLICATION, WITHOUT LONG-TERM CURRENT USE OF INSULIN (MULTI): Primary | ICD-10-CM

## 2024-03-12 RX ORDER — PEN NEEDLE, DIABETIC 30 GX3/16"
1 NEEDLE, DISPOSABLE MISCELLANEOUS DAILY
Qty: 100 EACH | Refills: 1 | Status: SHIPPED | OUTPATIENT
Start: 2024-03-12 | End: 2024-09-28

## 2024-03-13 DIAGNOSIS — E11.9 TYPE 2 DIABETES MELLITUS WITHOUT COMPLICATION, WITHOUT LONG-TERM CURRENT USE OF INSULIN (MULTI): Primary | ICD-10-CM

## 2024-03-21 ENCOUNTER — PHARMACY VISIT (OUTPATIENT)
Dept: PHARMACY | Facility: CLINIC | Age: 54
End: 2024-03-21
Payer: COMMERCIAL

## 2024-03-21 PROCEDURE — RXMED WILLOW AMBULATORY MEDICATION CHARGE

## 2024-03-22 PROCEDURE — RXMED WILLOW AMBULATORY MEDICATION CHARGE

## 2024-03-25 ENCOUNTER — PHARMACY VISIT (OUTPATIENT)
Dept: PHARMACY | Facility: CLINIC | Age: 54
End: 2024-03-25
Payer: COMMERCIAL

## 2024-04-02 ENCOUNTER — OFFICE VISIT (OUTPATIENT)
Dept: SURGERY | Facility: HOSPITAL | Age: 54
End: 2024-04-02
Payer: COMMERCIAL

## 2024-04-02 DIAGNOSIS — L72.0 INFECTED EPIDERMOID CYST: Primary | ICD-10-CM

## 2024-04-02 DIAGNOSIS — D17.9 LIPOMA, UNSPECIFIED SITE: ICD-10-CM

## 2024-04-02 DIAGNOSIS — L08.9 INFECTED EPIDERMOID CYST: Primary | ICD-10-CM

## 2024-04-02 PROCEDURE — 1036F TOBACCO NON-USER: CPT | Performed by: SURGERY

## 2024-04-02 PROCEDURE — 99214 OFFICE O/P EST MOD 30 MIN: CPT | Performed by: SURGERY

## 2024-04-02 PROCEDURE — 4010F ACE/ARB THERAPY RXD/TAKEN: CPT | Performed by: SURGERY

## 2024-04-02 PROCEDURE — 3046F HEMOGLOBIN A1C LEVEL >9.0%: CPT | Performed by: SURGERY

## 2024-04-02 ASSESSMENT — ENCOUNTER SYMPTOMS: DEPRESSION: 0

## 2024-04-02 ASSESSMENT — PAIN SCALES - GENERAL: PAINLEVEL: 0-NO PAIN

## 2024-04-02 NOTE — PROGRESS NOTES
History Of Present Illness  Joe Cruz is a 53 y.o. male presenting with painful low midline back cyst. I had removed an upper back cyst years ago.  Hx of HTN, DM, HLD. .     Past Medical History  Past Medical History:   Diagnosis Date    Other specified health status     No pertinent past medical history       Surgical History  Past Surgical History:   Procedure Laterality Date    OTHER SURGICAL HISTORY  12/05/2019    No history of surgery        Social History  He reports that he has never smoked. He has never used smokeless tobacco. He reports current alcohol use. He reports that he does not use drugs.    Family History  No family history on file.     Allergies  Penicillins    Review of Systems  Constitutional: Intentional 20 pound weight loss, no fevers, no malaise  HEENT: negative  Neck: negative  Pulmonary: no SOB, no cough  CV: no chest pain, otherwise negative  GI: no pain, no diarrhea, no bloody stools, no constipation  : no hematuria, retention.  MS: no aches/pains  Neurologic: negative  Skin: no rashes, lesions  HEME: no bleeding tendency, no bruising  Psych: no mood issues    Physical Exam  General: well appearing, no acute distress, well nourished  HEENT: normal  Neck: supple  Pulmonary: lungs clear to auscultation bilaterally  CV: RR, S1S2, no murmurs.  Pulses palpable and equal.  Good capillary refill  Abdomen: soft, non tender, no masses  : grossly normal external genitalia  MS: grossly normal  Neurologic: alert and oriented, strength/sensation intact  Skin: 4 x 4 cm epidermoid cyst firm and tender mid lower back  Psych: mood appropriate    Last Recorded Vitals  There were no vitals taken for this visit.    Relevant Results           Assessment/Plan       Patient with a symptomatic epidermoid cyst mid lower back.  He would like to have this surgically excised.  We discussed surgical technique along with the attendant risks.  This can be scheduled at the [facility].  He wants to  do it with local anesthesia alone       I spent 25 minutes in the professional and overall care of this patient.      Georges Aguilar MD

## 2024-04-02 NOTE — H&P (VIEW-ONLY)
History Of Present Illness  Joe Cruz is a 53 y.o. male presenting with painful low midline back cyst. I had removed an upper back cyst years ago.  Hx of HTN, DM, HLD. .     Past Medical History  Past Medical History:   Diagnosis Date    Other specified health status     No pertinent past medical history       Surgical History  Past Surgical History:   Procedure Laterality Date    OTHER SURGICAL HISTORY  12/05/2019    No history of surgery        Social History  He reports that he has never smoked. He has never used smokeless tobacco. He reports current alcohol use. He reports that he does not use drugs.    Family History  No family history on file.     Allergies  Penicillins    Review of Systems  Constitutional: Intentional 20 pound weight loss, no fevers, no malaise  HEENT: negative  Neck: negative  Pulmonary: no SOB, no cough  CV: no chest pain, otherwise negative  GI: no pain, no diarrhea, no bloody stools, no constipation  : no hematuria, retention.  MS: no aches/pains  Neurologic: negative  Skin: no rashes, lesions  HEME: no bleeding tendency, no bruising  Psych: no mood issues    Physical Exam  General: well appearing, no acute distress, well nourished  HEENT: normal  Neck: supple  Pulmonary: lungs clear to auscultation bilaterally  CV: RR, S1S2, no murmurs.  Pulses palpable and equal.  Good capillary refill  Abdomen: soft, non tender, no masses  : grossly normal external genitalia  MS: grossly normal  Neurologic: alert and oriented, strength/sensation intact  Skin: 4 x 4 cm epidermoid cyst firm and tender mid lower back  Psych: mood appropriate    Last Recorded Vitals  There were no vitals taken for this visit.    Relevant Results           Assessment/Plan       Patient with a symptomatic epidermoid cyst mid lower back.  He would like to have this surgically excised.  We discussed surgical technique along with the attendant risks.  This can be scheduled at the [facility].  He wants to  do it with local anesthesia alone       I spent 25 minutes in the professional and overall care of this patient.      Georges Aguilar MD

## 2024-04-02 NOTE — LETTER
April 2, 2024     Damon Baig MD  [address]  [place] [facility], [facility] [address]  [facility] [address]    Patient: Joe Cruz   YOB: 1970   Date of Visit: 4/2/2024       Dear Dr. Damon Baig MD:    Thank you for referring Joe Cruz to me for evaluation. Below are my notes for this consultation.  If you have questions, please do not hesitate to call me. I look forward to following your patient along with you.       Sincerely,     Georges Aguilar MD      CC: No Recipients  ______________________________________________________________________________________    History Of Present Illness  Joe Cruz is a 53 y.o. male presenting with painful low midline back cyst. I had removed an upper back cyst years ago.  Hx of HTN, DM, HLD. .     Past Medical History  Past Medical History:   Diagnosis Date   • Other specified health status     No pertinent past medical history       Surgical History  Past Surgical History:   Procedure Laterality Date   • OTHER SURGICAL HISTORY  12/05/2019    No history of surgery        Social History  He reports that he has never smoked. He has never used smokeless tobacco. He reports current alcohol use. He reports that he does not use drugs.    Family History  No family history on file.     Allergies  Penicillins    Review of Systems  Constitutional: Intentional 20 pound weight loss, no fevers, no malaise  HEENT: negative  Neck: negative  Pulmonary: no SOB, no cough  CV: no chest pain, otherwise negative  GI: no pain, no diarrhea, no bloody stools, no constipation  : no hematuria, retention.  MS: no aches/pains  Neurologic: negative  Skin: no rashes, lesions  HEME: no bleeding tendency, no bruising  Psych: no mood issues    Physical Exam  General: well appearing, no acute distress, well nourished  HEENT: normal  Neck: supple  Pulmonary: lungs clear to auscultation bilaterally  CV: RR, S1S2, no murmurs.  Pulses palpable and equal.  Good  capillary refill  Abdomen: soft, non tender, no masses  : grossly normal external genitalia  MS: grossly normal  Neurologic: alert and oriented, strength/sensation intact  Skin: 4 x 4 cm epidermoid cyst firm and tender mid lower back  Psych: mood appropriate    Last Recorded Vitals  There were no vitals taken for this visit.    Relevant Results           Assessment/Plan      Patient with a symptomatic epidermoid cyst mid lower back.  He would like to have this surgically excised.  We discussed surgical technique along with the attendant risks.  This can be scheduled at the [facility].  He wants to do it with local anesthesia alone       I spent 25 minutes in the professional and overall care of this patient.      Georges Aguilar MD

## 2024-04-13 DIAGNOSIS — E11.9 TYPE 2 DIABETES MELLITUS WITHOUT COMPLICATION, WITHOUT LONG-TERM CURRENT USE OF INSULIN (MULTI): ICD-10-CM

## 2024-04-15 PROCEDURE — RXMED WILLOW AMBULATORY MEDICATION CHARGE

## 2024-04-18 ENCOUNTER — PHARMACY VISIT (OUTPATIENT)
Dept: PHARMACY | Facility: CLINIC | Age: 54
End: 2024-04-18
Payer: COMMERCIAL

## 2024-04-23 RX ORDER — FENTANYL CITRATE 50 UG/ML
25 INJECTION, SOLUTION INTRAMUSCULAR; INTRAVENOUS EVERY 5 MIN PRN
Status: CANCELLED | OUTPATIENT
Start: 2024-04-23

## 2024-04-23 RX ORDER — ALBUTEROL SULFATE 0.83 MG/ML
2.5 SOLUTION RESPIRATORY (INHALATION) ONCE AS NEEDED
Status: CANCELLED | OUTPATIENT
Start: 2024-04-23

## 2024-04-23 RX ORDER — ACETAMINOPHEN 325 MG/1
650 TABLET ORAL EVERY 4 HOURS PRN
Status: CANCELLED | OUTPATIENT
Start: 2024-04-23

## 2024-04-23 RX ORDER — ONDANSETRON HYDROCHLORIDE 2 MG/ML
4 INJECTION, SOLUTION INTRAVENOUS ONCE AS NEEDED
Status: CANCELLED | OUTPATIENT
Start: 2024-04-23

## 2024-04-23 RX ORDER — FENTANYL CITRATE 50 UG/ML
50 INJECTION, SOLUTION INTRAMUSCULAR; INTRAVENOUS EVERY 5 MIN PRN
Status: CANCELLED | OUTPATIENT
Start: 2024-04-23

## 2024-04-23 RX ORDER — LABETALOL HYDROCHLORIDE 5 MG/ML
5 INJECTION, SOLUTION INTRAVENOUS ONCE AS NEEDED
Status: CANCELLED | OUTPATIENT
Start: 2024-04-23

## 2024-04-23 RX ORDER — SODIUM CHLORIDE, SODIUM LACTATE, POTASSIUM CHLORIDE, CALCIUM CHLORIDE 600; 310; 30; 20 MG/100ML; MG/100ML; MG/100ML; MG/100ML
100 INJECTION, SOLUTION INTRAVENOUS CONTINUOUS
Status: CANCELLED | OUTPATIENT
Start: 2024-04-23

## 2024-04-23 RX ORDER — LIDOCAINE IN NACL,ISO-OSMOT/PF 30 MG/3 ML
0.1 SYRINGE (ML) INJECTION ONCE
Status: CANCELLED | OUTPATIENT
Start: 2024-04-23 | End: 2024-04-23

## 2024-04-23 RX ORDER — METOCLOPRAMIDE HYDROCHLORIDE 5 MG/ML
10 INJECTION INTRAMUSCULAR; INTRAVENOUS ONCE AS NEEDED
Status: CANCELLED | OUTPATIENT
Start: 2024-04-23

## 2024-04-24 ENCOUNTER — HOSPITAL ENCOUNTER (OUTPATIENT)
Facility: CLINIC | Age: 54
Setting detail: OUTPATIENT SURGERY
Discharge: HOME | End: 2024-04-24
Attending: SURGERY | Admitting: SURGERY
Payer: COMMERCIAL

## 2024-04-24 VITALS
HEIGHT: 73 IN | TEMPERATURE: 96.8 F | RESPIRATION RATE: 16 BRPM | SYSTOLIC BLOOD PRESSURE: 115 MMHG | OXYGEN SATURATION: 97 % | BODY MASS INDEX: 26.53 KG/M2 | WEIGHT: 200.18 LBS | HEART RATE: 77 BPM | DIASTOLIC BLOOD PRESSURE: 62 MMHG

## 2024-04-24 DIAGNOSIS — L72.0 INFECTED EPIDERMOID CYST: Primary | ICD-10-CM

## 2024-04-24 DIAGNOSIS — L08.9 INFECTED EPIDERMOID CYST: Primary | ICD-10-CM

## 2024-04-24 PROCEDURE — 88304 TISSUE EXAM BY PATHOLOGIST: CPT | Performed by: PATHOLOGY

## 2024-04-24 PROCEDURE — 0752T DGTZ GLS MCRSCP SLD LVL III: CPT | Mod: TC,SUR | Performed by: SURGERY

## 2024-04-24 PROCEDURE — 2500000005 HC RX 250 GENERAL PHARMACY W/O HCPCS: Performed by: SURGERY

## 2024-04-24 PROCEDURE — A4217 STERILE WATER/SALINE, 500 ML: HCPCS | Performed by: SURGERY

## 2024-04-24 PROCEDURE — 3600000003 HC OR TIME - INITIAL BASE CHARGE - PROCEDURE LEVEL THREE: Performed by: SURGERY

## 2024-04-24 PROCEDURE — 2500000004 HC RX 250 GENERAL PHARMACY W/ HCPCS (ALT 636 FOR OP/ED): Performed by: SURGERY

## 2024-04-24 PROCEDURE — 3600000008 HC OR TIME - EACH INCREMENTAL 1 MINUTE - PROCEDURE LEVEL THREE: Performed by: SURGERY

## 2024-04-24 PROCEDURE — 7100000010 HC PHASE TWO TIME - EACH INCREMENTAL 1 MINUTE: Performed by: SURGERY

## 2024-04-24 PROCEDURE — 7100000009 HC PHASE TWO TIME - INITIAL BASE CHARGE: Performed by: SURGERY

## 2024-04-24 PROCEDURE — 11406 EXC TR-EXT B9+MARG >4.0 CM: CPT | Performed by: SURGERY

## 2024-04-24 RX ORDER — LIDOCAINE HYDROCHLORIDE 10 MG/ML
INJECTION INFILTRATION; PERINEURAL AS NEEDED
Status: DISCONTINUED | OUTPATIENT
Start: 2024-04-24 | End: 2024-04-24 | Stop reason: HOSPADM

## 2024-04-24 RX ORDER — IBUPROFEN 600 MG/1
600 TABLET ORAL EVERY 6 HOURS PRN
Qty: 20 TABLET | Refills: 0 | Status: SHIPPED | OUTPATIENT
Start: 2024-04-24

## 2024-04-24 RX ORDER — SODIUM CHLORIDE 0.9 G/100ML
IRRIGANT IRRIGATION AS NEEDED
Status: DISCONTINUED | OUTPATIENT
Start: 2024-04-24 | End: 2024-04-24 | Stop reason: HOSPADM

## 2024-04-24 RX ORDER — BUPIVACAINE HYDROCHLORIDE 5 MG/ML
INJECTION, SOLUTION PERINEURAL AS NEEDED
Status: DISCONTINUED | OUTPATIENT
Start: 2024-04-24 | End: 2024-04-24 | Stop reason: HOSPADM

## 2024-04-24 ASSESSMENT — COLUMBIA-SUICIDE SEVERITY RATING SCALE - C-SSRS
2. HAVE YOU ACTUALLY HAD ANY THOUGHTS OF KILLING YOURSELF?: NO
6. HAVE YOU EVER DONE ANYTHING, STARTED TO DO ANYTHING, OR PREPARED TO DO ANYTHING TO END YOUR LIFE?: NO
1. IN THE PAST MONTH, HAVE YOU WISHED YOU WERE DEAD OR WISHED YOU COULD GO TO SLEEP AND NOT WAKE UP?: NO

## 2024-04-24 ASSESSMENT — PAIN - FUNCTIONAL ASSESSMENT: PAIN_FUNCTIONAL_ASSESSMENT: 0-10

## 2024-04-24 ASSESSMENT — PAIN SCALES - GENERAL
PAINLEVEL_OUTOF10: 0 - NO PAIN

## 2024-04-24 NOTE — OP NOTE
Excision Lesion Torso Operative Note     Date: 2024  OR Location: [facility] OR    Name: Joe Cruz, : 1970, Age: 53 y.o., MRN: [id number], Sex: male    Diagnosis  Pre-op Diagnosis     * Infected epidermoid cyst [L72.0, L08.9] Post-op Diagnosis     * Infected epidermoid cyst [L72.0, L08.9]     Procedures  Excision Lesion Torso  72359 - NH EXC B9 LESION MRGN XCP SK TG T/A/L >4.0 CM      Surgeons      * Georges Aguilar - Primary    Resident/Fellow/Other Assistant:  Surgeons and Role:  * No surgeons found with a matching role *    Procedure Summary  Anesthesia: Local  ASA: ASA status not filed in the log.  Anesthesia Staff: No anesthesia staff entered.  Estimated Blood Loss: 10mL  Intra-op Medications:   Administrations occurring from 1330 to 1430 on 24:   Medication Name Total Dose   lidocaine (Xylocaine) 10 mg/mL (1 %) injection 10 mL   BUPivacaine HCl (Marcaine) 0.5 % (5 mg/mL) injection 10 mL   sodium chloride 0.9 % irrigation solution 150 mL              Anesthesia Record               Intraprocedure I/O Totals       None           Specimen:   ID Type Source Tests Collected by Time   1 : A. BACK EPIDERMOID CYST Tissue SOFT TISSUE MASS RESECTION SURGICAL PATHOLOGY EXAM Georges Aguilar MD 2024 1348        Staff:   Circulator: Maritza Arnold RN; Ailin Snyder RN  Scrub Person: Manuel De Paz; Tomi Suarez RN         Drains and/or Catheters: * None in log *    Tourniquet Times:         Implants:     Findings: Ruptured infected epidermoid cyst 4 x 5 cm    Indications: Joe Cruz is an 53 y.o. male who is having surgery for Infected epidermoid cyst [L72.0, L08.9].     The patient was seen in the preoperative area. The risks, benefits, complications, treatment options, non-operative alternatives, expected recovery and outcomes were discussed with the patient. The possibilities of reaction to medication, pulmonary aspiration, injury to surrounding structures, bleeding, recurrent infection,  the need for additional procedures, failure to diagnose a condition, and creating a complication requiring transfusion or operation were discussed with the patient. The patient concurred with the proposed plan, giving informed consent.  The site of surgery was properly noted/marked if necessary per policy. The patient has been actively warmed in preoperative area. Preoperative antibiotics are not indicated. Venous thrombosis prophylaxis are not indicated.    Procedure Details: Patient comes in for elective wide excision of the infected epidermoid cyst.  Risks benefits detail consent was obtained.  He is brought to the OR placed right lateral decubitus.  We did timeout confirm patient procedure.  We prepped and draped sterilely.  I drew elliptical lines around the lesion and then injected 20 cc of local.  Then using scalpel and electrocautery wide en bloc excision of the ruptured cyst along with the overlying skin was performed down to healthy subcutaneous tissue and fascia.  This was sent off the field.  Subcutaneous flaps were raised on each side to facilitate closure.  We irrigated the wound and make sure it was hemostatic.  I closed the deeper layer with interrupted 2-0 Vicryl.  Dermis closed with 3-0 Vicryl.  Skin was then reapproximated with a running 4-0 Monocryl subcuticular suture.  Dermabond was applied along with Steri-Strips and a dressing patient was recovery room satisfactory condition    Complications:  None; patient tolerated the procedure well.    Disposition: PACU - hemodynamically stable.  Condition: stable         Additional Details:     Attending Attestation: I was present for the entire procedure.    Georges Aguilar  Phone Number: [phone]

## 2024-05-01 LAB
LABORATORY COMMENT REPORT: NORMAL
PATH REPORT.FINAL DX SPEC: NORMAL
PATH REPORT.GROSS SPEC: NORMAL
PATH REPORT.RELEVANT HX SPEC: NORMAL
PATH REPORT.TOTAL CANCER: NORMAL

## 2024-05-11 DIAGNOSIS — E11.9 TYPE 2 DIABETES MELLITUS WITHOUT COMPLICATION, WITHOUT LONG-TERM CURRENT USE OF INSULIN (MULTI): ICD-10-CM

## 2024-05-11 PROCEDURE — RXMED WILLOW AMBULATORY MEDICATION CHARGE

## 2024-05-20 ENCOUNTER — PHARMACY VISIT (OUTPATIENT)
Dept: PHARMACY | Facility: CLINIC | Age: 54
End: 2024-05-20
Payer: COMMERCIAL

## 2024-06-12 DIAGNOSIS — E11.9 TYPE 2 DIABETES MELLITUS WITHOUT COMPLICATION, WITHOUT LONG-TERM CURRENT USE OF INSULIN (MULTI): ICD-10-CM

## 2024-06-12 PROCEDURE — RXMED WILLOW AMBULATORY MEDICATION CHARGE

## 2024-06-14 ENCOUNTER — PHARMACY VISIT (OUTPATIENT)
Dept: PHARMACY | Facility: CLINIC | Age: 54
End: 2024-06-14
Payer: COMMERCIAL

## 2024-07-08 PROCEDURE — RXMED WILLOW AMBULATORY MEDICATION CHARGE

## 2024-07-15 ENCOUNTER — PHARMACY VISIT (OUTPATIENT)
Dept: PHARMACY | Facility: CLINIC | Age: 54
End: 2024-07-15
Payer: COMMERCIAL

## 2024-08-02 ENCOUNTER — APPOINTMENT (OUTPATIENT)
Dept: RADIOLOGY | Facility: HOSPITAL | Age: 54
End: 2024-08-02
Payer: COMMERCIAL

## 2024-08-02 ENCOUNTER — HOSPITAL ENCOUNTER (EMERGENCY)
Facility: HOSPITAL | Age: 54
Discharge: HOME | End: 2024-08-03
Attending: EMERGENCY MEDICINE
Payer: COMMERCIAL

## 2024-08-02 DIAGNOSIS — L02.619 FOOT ABSCESS: Primary | ICD-10-CM

## 2024-08-02 LAB
ALBUMIN SERPL BCP-MCNC: 4.4 G/DL (ref 3.4–5)
ALP SERPL-CCNC: 76 U/L (ref 33–120)
ALT SERPL W P-5'-P-CCNC: 15 U/L (ref 10–52)
ANION GAP SERPL CALC-SCNC: 12 MMOL/L (ref 10–20)
AST SERPL W P-5'-P-CCNC: 11 U/L (ref 9–39)
BASOPHILS # BLD AUTO: 0.05 X10*3/UL (ref 0–0.1)
BASOPHILS NFR BLD AUTO: 0.7 %
BILIRUB SERPL-MCNC: 1.6 MG/DL (ref 0–1.2)
BUN SERPL-MCNC: 14 MG/DL (ref 6–23)
CALCIUM SERPL-MCNC: 9.3 MG/DL (ref 8.6–10.3)
CHLORIDE SERPL-SCNC: 103 MMOL/L (ref 98–107)
CO2 SERPL-SCNC: 28 MMOL/L (ref 21–32)
CREAT SERPL-MCNC: 0.89 MG/DL (ref 0.5–1.3)
CRP SERPL-MCNC: 0.5 MG/DL
EGFRCR SERPLBLD CKD-EPI 2021: >90 ML/MIN/1.73M*2
EOSINOPHIL # BLD AUTO: 0.29 X10*3/UL (ref 0–0.7)
EOSINOPHIL NFR BLD AUTO: 4.3 %
ERYTHROCYTE [DISTWIDTH] IN BLOOD BY AUTOMATED COUNT: 12.5 % (ref 11.5–14.5)
ERYTHROCYTE [SEDIMENTATION RATE] IN BLOOD BY WESTERGREN METHOD: 5 MM/H (ref 0–20)
GLUCOSE SERPL-MCNC: 178 MG/DL (ref 74–99)
HCT VFR BLD AUTO: 47.3 % (ref 41–52)
HGB BLD-MCNC: 16.8 G/DL (ref 13.5–17.5)
IMM GRANULOCYTES # BLD AUTO: 0.02 X10*3/UL (ref 0–0.7)
IMM GRANULOCYTES NFR BLD AUTO: 0.3 % (ref 0–0.9)
LYMPHOCYTES # BLD AUTO: 2.61 X10*3/UL (ref 1.2–4.8)
LYMPHOCYTES NFR BLD AUTO: 38.5 %
MCH RBC QN AUTO: 30.1 PG (ref 26–34)
MCHC RBC AUTO-ENTMCNC: 35.5 G/DL (ref 32–36)
MCV RBC AUTO: 85 FL (ref 80–100)
MONOCYTES # BLD AUTO: 0.64 X10*3/UL (ref 0.1–1)
MONOCYTES NFR BLD AUTO: 9.4 %
NEUTROPHILS # BLD AUTO: 3.17 X10*3/UL (ref 1.2–7.7)
NEUTROPHILS NFR BLD AUTO: 46.8 %
NRBC BLD-RTO: 0 /100 WBCS (ref 0–0)
PLATELET # BLD AUTO: 250 X10*3/UL (ref 150–450)
POTASSIUM SERPL-SCNC: 3.8 MMOL/L (ref 3.5–5.3)
PROT SERPL-MCNC: 6.8 G/DL (ref 6.4–8.2)
RBC # BLD AUTO: 5.59 X10*6/UL (ref 4.5–5.9)
SODIUM SERPL-SCNC: 139 MMOL/L (ref 136–145)
WBC # BLD AUTO: 6.8 X10*3/UL (ref 4.4–11.3)

## 2024-08-02 PROCEDURE — 36415 COLL VENOUS BLD VENIPUNCTURE: CPT

## 2024-08-02 PROCEDURE — 85025 COMPLETE CBC W/AUTO DIFF WBC: CPT

## 2024-08-02 PROCEDURE — 73610 X-RAY EXAM OF ANKLE: CPT | Mod: LEFT SIDE | Performed by: RADIOLOGY

## 2024-08-02 PROCEDURE — 86140 C-REACTIVE PROTEIN: CPT

## 2024-08-02 PROCEDURE — 73630 X-RAY EXAM OF FOOT: CPT | Mod: LT

## 2024-08-02 PROCEDURE — 85652 RBC SED RATE AUTOMATED: CPT

## 2024-08-02 PROCEDURE — 87040 BLOOD CULTURE FOR BACTERIA: CPT | Mod: 59,AHULAB

## 2024-08-02 PROCEDURE — 99284 EMERGENCY DEPT VISIT MOD MDM: CPT | Mod: 25 | Performed by: EMERGENCY MEDICINE

## 2024-08-02 PROCEDURE — 73630 X-RAY EXAM OF FOOT: CPT | Mod: LEFT SIDE | Performed by: RADIOLOGY

## 2024-08-02 PROCEDURE — 73610 X-RAY EXAM OF ANKLE: CPT | Mod: LT

## 2024-08-02 PROCEDURE — 84075 ASSAY ALKALINE PHOSPHATASE: CPT

## 2024-08-02 RX ORDER — DOXYCYCLINE HYCLATE 100 MG
100 TABLET ORAL ONCE
Status: COMPLETED | OUTPATIENT
Start: 2024-08-03 | End: 2024-08-03

## 2024-08-02 RX ORDER — LIDOCAINE HYDROCHLORIDE 10 MG/ML
INJECTION INFILTRATION; PERINEURAL
Status: DISCONTINUED
Start: 2024-08-02 | End: 2024-08-03 | Stop reason: HOSPADM

## 2024-08-02 ASSESSMENT — COLUMBIA-SUICIDE SEVERITY RATING SCALE - C-SSRS
6. HAVE YOU EVER DONE ANYTHING, STARTED TO DO ANYTHING, OR PREPARED TO DO ANYTHING TO END YOUR LIFE?: NO
1. IN THE PAST MONTH, HAVE YOU WISHED YOU WERE DEAD OR WISHED YOU COULD GO TO SLEEP AND NOT WAKE UP?: NO
2. HAVE YOU ACTUALLY HAD ANY THOUGHTS OF KILLING YOURSELF?: NO

## 2024-08-02 ASSESSMENT — PAIN - FUNCTIONAL ASSESSMENT: PAIN_FUNCTIONAL_ASSESSMENT: 0-10

## 2024-08-02 ASSESSMENT — PAIN SCALES - GENERAL: PAINLEVEL_OUTOF10: 2

## 2024-08-02 NOTE — ED TRIAGE NOTES
Pt arrives to the ED from urgent care. Patient is complaining of L leg pain. Urgent care was concerned about cellulitis and osteomyelitis. Hx of diabetes. Pt states he is unsure if there is a blister or a wound on the L foot.

## 2024-08-03 VITALS
OXYGEN SATURATION: 98 % | SYSTOLIC BLOOD PRESSURE: 127 MMHG | DIASTOLIC BLOOD PRESSURE: 76 MMHG | HEIGHT: 72 IN | HEART RATE: 76 BPM | TEMPERATURE: 97.8 F | RESPIRATION RATE: 17 BRPM | WEIGHT: 200 LBS | BODY MASS INDEX: 27.09 KG/M2

## 2024-08-03 PROBLEM — L02.619 FOOT ABSCESS: Status: ACTIVE | Noted: 2024-08-03

## 2024-08-03 PROCEDURE — 2500000001 HC RX 250 WO HCPCS SELF ADMINISTERED DRUGS (ALT 637 FOR MEDICARE OP): Performed by: EMERGENCY MEDICINE

## 2024-08-03 RX ORDER — BACITRACIN ZINC 500 UNIT/G
OINTMENT IN PACKET (EA) TOPICAL
Status: DISCONTINUED
Start: 2024-08-03 | End: 2024-08-03 | Stop reason: WASHOUT

## 2024-08-03 RX ORDER — BACITRACIN ZINC 500 UNIT/G
OINTMENT (GRAM) TOPICAL 2 TIMES DAILY
Qty: 14 G | Refills: 0 | Status: SHIPPED | OUTPATIENT
Start: 2024-08-03

## 2024-08-03 RX ORDER — DOXYCYCLINE 100 MG/1
100 CAPSULE ORAL 2 TIMES DAILY
Qty: 20 CAPSULE | Refills: 0 | Status: SHIPPED | OUTPATIENT
Start: 2024-08-03 | End: 2024-08-13

## 2024-08-03 NOTE — ED TRIAGE NOTES
TRIAGE NOTE   I saw the patient as the Clinician in Triage and performed a brief history and physical exam, established acuity, and ordered appropriate tests to develop basic plan of care. Patient will be seen by an OMER, resident and/or physician who will independently evaluate the patient. Please see subsequent provider notes for further details and disposition.     Brief HPI: In brief, Joe Cruz is a 53 y.o. male that presents for left foot pain and possible infection.  Sent from urgent care due to concern for cellulitis or osteomyelitis.  History of diabetes.  Reports pain and swelling over the distal left foot, states swelling has moved up the foot and into the lower leg as well.  Denies fever or chills.  Denies bleeding or drainage.     Focused Physical exam:   Alert and oriented, ambulates unassisted.  No acute distress.  Vital signs stable.  Edema over the distal left foot, erythema and warmth, large blister at the ball of the foot.  Able to flex and extend all digits and the ankle.    Plan/MDM:   Workup initiated including labs and x-ray.  Please see subsequent provider note for further details and disposition.    As provider-in-triage, I performed a medical screening history and physical exam on this patient. For the remainder of the patient's workup and ED course, please see the main ED provider note.  I evaluated this patient in triage with the RN. Due to the patient's complaint, labs, imaging, and/or interventions were ordered by me in an attempt to expedite/facilitate patient care, however I am not participating in care after evaluation. This is a preliminary assessment. Patient does not appear in acute distress at this time. They are stable and will have a full evaluation as soon as possible. They will be cared for by another provider who will possibly order more labs, imaging and/or interventions. Patient did not have a full ROS or PE completed by myself, however below is a summary with reasons  for orders.  Patient to be reevaluated once in formal ED bed.

## 2024-08-03 NOTE — ED PROVIDER NOTES
HPI   No chief complaint on file.      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  Triage ordered x-rays of his foot and ankle both x-rays are normal.  The soft tissues swelling that they are reporting is a blood blister/abscess of the distal plantar forefoot on the left.  There is no foreign body.  I unroofed the blister and got a small amount of pus and serous fluid the wound was then cleaned with Betadine and normal saline and dressed with bacitracin and sterile dressing.  There is no red streaking there is no fever the foot is not tender he has a normal white count sed rate and C-reactive protein but because of his diabetes he is at high risk of infection even with antibiotics.  I started doxycycline as well as bacitracin ointment dressing changes and I advised podiatry for follow-up.  He knows to return if fever develops or symptoms worsen.    Procedure note the wound was cleaned with Betadine sterile dressing and the blister was unroofed revealing serosanguineous and scant pus the wound was then dressed with bacitracin and sterile dressing.  Prior to dressing the wound was cleaned copiously with normal saline.      PMH: Diabetes hypercholesterolemia    SH negative tobacco Alcohol  FH negative heart disease positive diabetes  ROS  General Appears in distress  HEENT: No sore throat, No Visual Loss, No Headache, No Ear Pain  Neck: Denies neck pain  Chest: No chest pain, no pleuritic pain, no chest wall injury  Pulmonary: No SOB, No Cough, No Sputum production, No Wheezing  GI: No abdominal pain, no nausea or vomiting, no diarrhea.  : No dysuria, no frequency, no hematuria.  Extremities: Painful gait to the left foot with a blood blister/abscess to the plantar forefoot  Psych: Normal interaction, no anxiety, no depression, no suicidal ideation  Skin: No rashes    ROS is otherwise  negative    PE: General: Appears in distress        HEENT: Throat is moist without exudate, midline uvula dentate intact, Tms clear with normal anatomy.        Neck: Supple non tender        Chest CTA, good AE, no wheezing, rales, or rhonci        CVA: RRR S1S2 no S3S4 or murmur        ABD: W/S/NT no HSM, no pulsatile masses, good bowel sounds        Extremities: Excellent distal pulses, brisk capillary refill. Full ROM positive blood blister/abscess to the left plantar forefoot without any erythema streaking tenderness or heat.        Psych: Normal interactions with no signs of depression  or suicidal ideation.        Neuro: Alert and oriented, moves all and feels all.    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  Triage ordered x-rays of his foot and ankle both x-rays are normal.  The soft tissues swelling that they are reporting is a blood blister/abscess of the distal plantar forefoot on the left.  There is no foreign body.  I unroofed the blister and got a small amount of pus and serous fluid the wound was then cleaned with Betadine and normal saline and dressed with bacitracin and sterile dressing.  There is no red streaking there is no fever the foot is not tender he has a normal white count sed rate and C-reactive protein but because of his diabetes he is at high risk of infection even with antibiotics.  I started doxycycline as well as bacitracin ointment dressing changes and I advised podiatry for follow-up.  He knows to return if fever develops or symptoms worsen.    Procedure note the wound was cleaned with Betadine sterile dressing and the blister was unroofed revealing serosanguineous and scant pus the wound was then dressed with bacitracin and sterile dressing.  Prior to dressing the wound was cleaned copiously with normal saline.                Patient History   Past  Medical History:   Diagnosis Date    Hyperlipidemia     Hypertension     Other specified health status     No pertinent past medical history    Type 2 diabetes mellitus (Multi)      Past Surgical History:   Procedure Laterality Date    OTHER SURGICAL HISTORY  12/05/2019    No history of surgery     No family history on file.  Social History     Tobacco Use    Smoking status: Never    Smokeless tobacco: Never   Substance Use Topics    Alcohol use: Yes    Drug use: Never       Physical Exam   ED Triage Vitals [08/02/24 1736]   Temperature Heart Rate Respirations BP   36.6 °C (97.8 °F) 95 18 135/88      Pulse Ox Temp src Heart Rate Source Patient Position   99 % -- -- --      BP Location FiO2 (%)     -- --       Physical Exam      ED Course & MDM   Diagnoses as of 08/03/24 0051   Foot abscess                       Shaila Coma Scale Score: 15                        Medical Decision Making      Procedure  Procedures     Manuel Becerra MD  08/03/24 0232

## 2024-08-03 NOTE — CONSULTS
Consults    Reason For Consult  Left foot pain    Consultant: Manuel Becerra MD    History Of Present Illness  Joe Cruz is a 53 y.o. male presenting with left foot pain. States he didn't know what was going on but was told he had a blister. Denies stepping on anything that he knows of, only noted pain for the past week while working. He is a diabetic but does not regularly check his sugars, saying he wasn't told to. Denies constitutional symptoms     Past Medical History  He has a past medical history of Hyperlipidemia, Hypertension, Other specified health status, and Type 2 diabetes mellitus (Multi).    Surgical History  He has a past surgical history that includes Other surgical history (12/05/2019).     Social History  He reports that he has never smoked. He has never used smokeless tobacco. He reports current alcohol use. He reports that he does not use drugs.    Family History  No family history on file.     Allergies  Penicillins    Review of Systems    REVIEW OF SYSTEMS  GENERAL:  Negative for malaise, significant weight loss, fever  HEENT:  No changes in hearing or vision, no nose bleeds or other nasal problems and Negative for frequent or significant headaches  NECK:  Negative for lumps, goiter, pain and significant neck swelling  RESPIRATORY:  Negative for cough, wheezing and shortness of breath  CARDIOVASCULAR:  Negative for chest pain, leg swelling and palpitations  GI:  Negative for abdominal discomfort, blood in stools or black stools and change in bowel habits  :  Negative for dysuria, frequency and incontinence  MUSCULOSKELETAL:  Negative for joint pain or swelling, back pain, and muscle pain.  SKIN:  Negative for lesions, rash, and itching  PSYCH:  Negative for sleep disturbance, mood disorder and recent psychosocial stressors  HEMATOLOGY/LYMPHOLOGY:  Negative for prolonged bleeding, bruising easily, and swollen nodes.  ENDOCRINE:  Negative for cold or heat intolerance, polyuria, polydipsia  and goiter  NEURO: Negative, denies any burning, tingling or numbness     Objective:   Vasc: DP and PT pulses are palpable bilateral.  CFT is less than 3 seconds bilateral.  Skin temperature is warm to cool proximal to distal bilateral.  Erythema noted dorsal foot    Neuro:  Light touch is intact to the foot bilateral.      Derm: Nails 1-5 bilateral are intact.  Skin is supple with normal texture and turgor noted.  Webspaces are clean, dry and intact bilateral.  Small macerated area plantar to the left 3rd digit where an I&D had already been performed, with a punctate lesion in this area    Ortho: Muscle strength is 5/5 for all pedal groups tested.  Ankle joint, subtalar joint, 1st MPJ and lesser MPJ ROM is full and without pain or crepitus.  Pain noted to lesion       Physical Exam     Last Recorded Vitals  Blood pressure 127/76, pulse 76, temperature 36.6 °C (97.8 °F), resp. rate 17, height 1.829 m (6'), weight 90.7 kg (200 lb), SpO2 98%.    Relevant Results  ESR, CRP, WBC all reviewed and noted to be within normal limits  Xrays noted and no foreign body noted, no gas, no wound     Assessment/Plan     Nonthermal blister left foot  Left foot pain  Left foot cellulitis    Patient examined and evaluated  I&D done by ED, area appears clean, I dressed this with betadine and a bandaid and instructed patient how to continue this  -Gave patient office contact information for follow up  -Agree with doxycycline per ED recommendations  -patient given strict return to ED instructions if infection worsens    I spent 45 minutes in the professional and overall care of this patient.      Dorothy Samano DPM

## 2024-08-03 NOTE — DISCHARGE INSTRUCTIONS
Clean and dress wound with bacitracin and sterile dressings twice a day watch for signs of infection red streaking fever chills and return immediately if that occurs otherwise Dr. Samano will see you through podiatry.

## 2024-08-04 LAB
BACTERIA BLD CULT: NORMAL
BACTERIA BLD CULT: NORMAL

## 2024-08-07 LAB
BACTERIA BLD CULT: NORMAL
BACTERIA BLD CULT: NORMAL

## 2024-08-07 PROCEDURE — RXMED WILLOW AMBULATORY MEDICATION CHARGE

## 2024-08-21 ENCOUNTER — PHARMACY VISIT (OUTPATIENT)
Dept: PHARMACY | Facility: CLINIC | Age: 54
End: 2024-08-21
Payer: COMMERCIAL

## 2024-09-13 PROCEDURE — RXMED WILLOW AMBULATORY MEDICATION CHARGE

## 2024-09-16 DIAGNOSIS — I10 PRIMARY HYPERTENSION: ICD-10-CM

## 2024-09-17 PROCEDURE — RXMED WILLOW AMBULATORY MEDICATION CHARGE

## 2024-09-17 RX ORDER — LISINOPRIL 10 MG/1
10 TABLET ORAL DAILY
Qty: 90 TABLET | Refills: 3 | Status: SHIPPED | OUTPATIENT
Start: 2024-09-17 | End: 2025-09-12

## 2024-09-19 ENCOUNTER — PHARMACY VISIT (OUTPATIENT)
Dept: PHARMACY | Facility: CLINIC | Age: 54
End: 2024-09-19
Payer: COMMERCIAL

## 2024-10-14 PROCEDURE — RXMED WILLOW AMBULATORY MEDICATION CHARGE

## 2024-10-17 ENCOUNTER — PHARMACY VISIT (OUTPATIENT)
Dept: PHARMACY | Facility: CLINIC | Age: 54
End: 2024-10-17
Payer: COMMERCIAL

## 2024-11-12 PROCEDURE — RXMED WILLOW AMBULATORY MEDICATION CHARGE

## 2024-11-19 ENCOUNTER — PHARMACY VISIT (OUTPATIENT)
Dept: PHARMACY | Facility: CLINIC | Age: 54
End: 2024-11-19
Payer: COMMERCIAL

## 2024-12-03 DIAGNOSIS — E78.5 DYSLIPIDEMIA: ICD-10-CM

## 2024-12-04 RX ORDER — ATORVASTATIN CALCIUM 40 MG/1
40 TABLET, FILM COATED ORAL DAILY
Qty: 90 TABLET | Refills: 1 | Status: SHIPPED | OUTPATIENT
Start: 2024-12-04 | End: 2025-06-02

## 2024-12-11 DIAGNOSIS — E11.9 TYPE 2 DIABETES MELLITUS WITHOUT COMPLICATION, WITHOUT LONG-TERM CURRENT USE OF INSULIN (MULTI): ICD-10-CM

## 2024-12-11 PROCEDURE — RXMED WILLOW AMBULATORY MEDICATION CHARGE

## 2024-12-16 ENCOUNTER — PHARMACY VISIT (OUTPATIENT)
Dept: PHARMACY | Facility: CLINIC | Age: 54
End: 2024-12-16
Payer: COMMERCIAL

## 2025-03-14 PROCEDURE — RXMED WILLOW AMBULATORY MEDICATION CHARGE

## 2025-03-19 ENCOUNTER — PHARMACY VISIT (OUTPATIENT)
Dept: PHARMACY | Facility: CLINIC | Age: 55
End: 2025-03-19
Payer: COMMERCIAL

## 2025-04-18 ENCOUNTER — OFFICE VISIT (OUTPATIENT)
Dept: ORTHOPEDIC SURGERY | Facility: HOSPITAL | Age: 55
End: 2025-04-18
Payer: COMMERCIAL

## 2025-04-18 ENCOUNTER — HOSPITAL ENCOUNTER (OUTPATIENT)
Dept: RADIOLOGY | Facility: HOSPITAL | Age: 55
Discharge: HOME | End: 2025-04-18
Payer: COMMERCIAL

## 2025-04-18 VITALS — HEIGHT: 72 IN | WEIGHT: 200 LBS | BODY MASS INDEX: 27.09 KG/M2

## 2025-04-18 DIAGNOSIS — M25.511 RIGHT SHOULDER PAIN, UNSPECIFIED CHRONICITY: ICD-10-CM

## 2025-04-18 PROCEDURE — 99204 OFFICE O/P NEW MOD 45 MIN: CPT | Performed by: ORTHOPAEDIC SURGERY

## 2025-04-18 PROCEDURE — 2500000004 HC RX 250 GENERAL PHARMACY W/ HCPCS (ALT 636 FOR OP/ED): Performed by: ORTHOPAEDIC SURGERY

## 2025-04-18 PROCEDURE — 99214 OFFICE O/P EST MOD 30 MIN: CPT | Mod: 25 | Performed by: ORTHOPAEDIC SURGERY

## 2025-04-18 PROCEDURE — 20610 DRAIN/INJ JOINT/BURSA W/O US: CPT | Mod: RT | Performed by: ORTHOPAEDIC SURGERY

## 2025-04-18 PROCEDURE — 3008F BODY MASS INDEX DOCD: CPT | Performed by: ORTHOPAEDIC SURGERY

## 2025-04-18 PROCEDURE — 4010F ACE/ARB THERAPY RXD/TAKEN: CPT | Performed by: ORTHOPAEDIC SURGERY

## 2025-04-18 PROCEDURE — 73030 X-RAY EXAM OF SHOULDER: CPT | Mod: RT

## 2025-04-18 RX ORDER — TRIAMCINOLONE ACETONIDE 40 MG/ML
40 INJECTION, SUSPENSION INTRA-ARTICULAR; INTRAMUSCULAR
Status: COMPLETED | OUTPATIENT
Start: 2025-04-18 | End: 2025-04-18

## 2025-04-18 RX ORDER — LIDOCAINE HYDROCHLORIDE 10 MG/ML
4 INJECTION, SOLUTION INFILTRATION; PERINEURAL
Status: COMPLETED | OUTPATIENT
Start: 2025-04-18 | End: 2025-04-18

## 2025-04-18 RX ADMIN — TRIAMCINOLONE ACETONIDE 40 MG: 400 INJECTION, SUSPENSION INTRA-ARTICULAR; INTRAMUSCULAR at 09:34

## 2025-04-18 RX ADMIN — LIDOCAINE HYDROCHLORIDE 4 ML: 10 INJECTION, SOLUTION INFILTRATION; PERINEURAL at 09:34

## 2025-04-18 ASSESSMENT — PAIN - FUNCTIONAL ASSESSMENT: PAIN_FUNCTIONAL_ASSESSMENT: 0-10

## 2025-04-18 ASSESSMENT — PAIN SCALES - GENERAL: PAINLEVEL_OUTOF10: 5 - MODERATE PAIN

## 2025-04-18 NOTE — PROGRESS NOTES
Evaluation of his right shoulder he has had several months of approximately 3 months of right shoulder pain posteriorly laterally and anteriorly primarily with attempting to rest on that shoulder or elevation or extension.  No specific history of injury.  The patient has taken ibuprofen for it it does not seem to be helping.  Patient is diabetic.    The patient is pleasant and cooperative.  The patient is alert and oriented ×3.  Auditory function is intact.  The patient is a good historian.  The patient is not in acute distress.  Eye exam significant for nonicteric sclera, intact ocular muscle movement.  Breathing is rhythmic symmetric and nonlabored.  Right upper extremity integument intact no lesions scars lacerations abrasions or contusions.  Radial pulse easily palpable brisk capillary refill light touch sensation over the shoulder arm forearm, and hand are intact.   strength is 5/5 motor power in internal and external rotation with the arm at the side is 5/5.  Active range of motion slightly limited elevation to 100 degrees painless and 150 degrees with pain.  Patient can maintain abduction against resistance grade 4/5.  External rotation and abduction is 80 degrees internal rotation and abduction 10 degrees with pain.  Motion limits are from pain.    Radiographs demonstrate preservation subacromial glenohumeral space no fracture dislocation subluxation or arthritic degenerative changes.    Right shoulder pain    Patient has right shoulder pain differential diagnosis includes subacromial subdeltoid bursitis and early adhesive capsulitis.  We discussed options and alternatives for treatment including potential benefit and possible risks of a subacromial cortisone injection.  The patient understands that injection may increase his blood sugar.    Patient elected to have an injection today subacromial injection was performed and tolerated well.  I have asked the patient to call back next week to report the  results.    This was dictated using voice recognition software and not corrected for grammatical or spelling errors.  L Inj/Asp: R subacromial bursa on 4/18/2025 9:34 AM  Indications: pain  Details: 22 G needle, posterior approach  Medications: 40 mg triamcinolone acetonide 40 mg/mL; 4 mL lidocaine 10 mg/mL (1 %)  Outcome: tolerated well, no immediate complications  Procedure, treatment alternatives, risks and benefits explained, specific risks discussed. Consent was given by the patient.

## 2025-05-16 ENCOUNTER — OFFICE VISIT (OUTPATIENT)
Dept: ORTHOPEDIC SURGERY | Facility: HOSPITAL | Age: 55
End: 2025-05-16
Payer: COMMERCIAL

## 2025-05-16 DIAGNOSIS — M75.101 TEAR OF RIGHT ROTATOR CUFF, UNSPECIFIED TEAR EXTENT, UNSPECIFIED WHETHER TRAUMATIC: ICD-10-CM

## 2025-05-16 PROCEDURE — 99212 OFFICE O/P EST SF 10 MIN: CPT | Performed by: ORTHOPAEDIC SURGERY

## 2025-05-16 PROCEDURE — 4010F ACE/ARB THERAPY RXD/TAKEN: CPT | Performed by: ORTHOPAEDIC SURGERY

## 2025-05-16 ASSESSMENT — PAIN - FUNCTIONAL ASSESSMENT: PAIN_FUNCTIONAL_ASSESSMENT: 0-10

## 2025-05-16 ASSESSMENT — PAIN SCALES - GENERAL: PAINLEVEL_OUTOF10: 4

## 2025-05-16 NOTE — PROGRESS NOTES
Patient has returned he definitely had improvement from the injection but still has some pain over the deltoid insertion.  Exam today elevation 170 with substitution, motor power in abduction 4/5 external rotation 4/5.    Right shoulder pain    Patient has right shoulder pain persistent after injection as well as weakness.  I am concerned about rotator cuff pathology I would like him to have an MRI scan follow-up after has been done.    This was dictated using voice recognition software and not corrected for grammatical or spelling errors.

## 2025-05-22 PROCEDURE — RXMED WILLOW AMBULATORY MEDICATION CHARGE

## 2025-05-28 ENCOUNTER — PHARMACY VISIT (OUTPATIENT)
Dept: PHARMACY | Facility: CLINIC | Age: 55
End: 2025-05-28
Payer: COMMERCIAL

## 2025-05-30 ENCOUNTER — HOSPITAL ENCOUNTER (OUTPATIENT)
Dept: RADIOLOGY | Facility: CLINIC | Age: 55
Discharge: HOME | End: 2025-05-30
Payer: COMMERCIAL

## 2025-05-30 DIAGNOSIS — M75.101 TEAR OF RIGHT ROTATOR CUFF, UNSPECIFIED TEAR EXTENT, UNSPECIFIED WHETHER TRAUMATIC: ICD-10-CM

## 2025-05-30 PROCEDURE — 73221 MRI JOINT UPR EXTREM W/O DYE: CPT | Mod: RT

## 2025-06-02 ENCOUNTER — OFFICE VISIT (OUTPATIENT)
Dept: ORTHOPEDIC SURGERY | Facility: HOSPITAL | Age: 55
End: 2025-06-02
Payer: COMMERCIAL

## 2025-06-02 DIAGNOSIS — M75.01 ADHESIVE CAPSULITIS OF RIGHT SHOULDER: ICD-10-CM

## 2025-06-02 PROCEDURE — 20610 DRAIN/INJ JOINT/BURSA W/O US: CPT | Mod: RT | Performed by: ORTHOPAEDIC SURGERY

## 2025-06-02 PROCEDURE — 4010F ACE/ARB THERAPY RXD/TAKEN: CPT | Performed by: ORTHOPAEDIC SURGERY

## 2025-06-02 PROCEDURE — 2500000004 HC RX 250 GENERAL PHARMACY W/ HCPCS (ALT 636 FOR OP/ED): Performed by: ORTHOPAEDIC SURGERY

## 2025-06-02 PROCEDURE — 99213 OFFICE O/P EST LOW 20 MIN: CPT | Performed by: ORTHOPAEDIC SURGERY

## 2025-06-02 PROCEDURE — 99213 OFFICE O/P EST LOW 20 MIN: CPT | Mod: 25 | Performed by: ORTHOPAEDIC SURGERY

## 2025-06-02 RX ORDER — TRIAMCINOLONE ACETONIDE 40 MG/ML
40 INJECTION, SUSPENSION INTRA-ARTICULAR; INTRAMUSCULAR
Status: COMPLETED | OUTPATIENT
Start: 2025-06-02 | End: 2025-06-02

## 2025-06-02 RX ORDER — LIDOCAINE HYDROCHLORIDE 10 MG/ML
4 INJECTION, SOLUTION INFILTRATION; PERINEURAL
Status: COMPLETED | OUTPATIENT
Start: 2025-06-02 | End: 2025-06-02

## 2025-06-02 RX ADMIN — TRIAMCINOLONE ACETONIDE 40 MG: 400 INJECTION, SUSPENSION INTRA-ARTICULAR; INTRAMUSCULAR at 15:05

## 2025-06-02 RX ADMIN — LIDOCAINE HYDROCHLORIDE 4 ML: 10 INJECTION, SOLUTION INFILTRATION; PERINEURAL at 15:05

## 2025-06-02 ASSESSMENT — PAIN - FUNCTIONAL ASSESSMENT: PAIN_FUNCTIONAL_ASSESSMENT: 0-10

## 2025-06-02 ASSESSMENT — PAIN SCALES - GENERAL: PAINLEVEL_OUTOF10: 4

## 2025-06-02 NOTE — PROGRESS NOTES
Patient is here to review the MRI scan of his left shoulder.  The MRI scan shows intact rotator cuff slight bursitis and definite thickening of the capsule.  Physical exam today elevation of 100 degrees/150 external rotation 40/60 degrees internal rotation mid iliac crest/T 10.       Diagnosis adhesive capsulitis    We discussed options for treatment and I have recommended intra-articular injection and that was performed today also recommended formal physical therapy consultation.  Also recommended consultation with the primary physician to check hemoglobin A1c.    Therapeutic injection was given today and the patient has been given a prescription for physical therapy and is going to follow-up in 3 weeks.    This was dictated using voice recognition software and not corrected for grammatical or spelling errors.

## 2025-06-02 NOTE — PROGRESS NOTES
L Inj/Asp: R glenohumeral on 6/2/2025 3:05 PM  Indications: pain  Details: 22 G needle, posterior approach  Medications: 40 mg triamcinolone acetonide 40 mg/mL; 4 mL lidocaine 10 mg/mL (1 %)  Outcome: tolerated well, no immediate complications  Procedure, treatment alternatives, risks and benefits explained, specific risks discussed. Consent was given by the patient. Immediately prior to procedure a time out was called to verify the correct patient, procedure, equipment, support staff and site/side marked as required.

## 2025-06-10 DIAGNOSIS — E11.9 TYPE 2 DIABETES MELLITUS WITHOUT COMPLICATION, WITHOUT LONG-TERM CURRENT USE OF INSULIN: ICD-10-CM

## 2025-06-12 PROCEDURE — RXMED WILLOW AMBULATORY MEDICATION CHARGE

## 2025-06-18 ENCOUNTER — PHARMACY VISIT (OUTPATIENT)
Dept: PHARMACY | Facility: CLINIC | Age: 55
End: 2025-06-18
Payer: COMMERCIAL

## 2025-06-19 ENCOUNTER — TELEPHONE (OUTPATIENT)
Dept: PRIMARY CARE | Facility: CLINIC | Age: 55
End: 2025-06-19
Payer: COMMERCIAL

## 2025-06-19 NOTE — TELEPHONE ENCOUNTER
Pt called the office 6/19/25 stating that he would like to have his blood work completed for his appt on 7/24/25.   Pt want to have A1C included.  The pt's last ov was 3/7/24.

## 2025-06-20 DIAGNOSIS — E53.8 B12 DEFICIENCY: ICD-10-CM

## 2025-06-20 DIAGNOSIS — E78.5 DYSLIPIDEMIA: ICD-10-CM

## 2025-06-20 DIAGNOSIS — E55.9 MILD VITAMIN D DEFICIENCY: ICD-10-CM

## 2025-06-20 DIAGNOSIS — E11.9 TYPE 2 DIABETES MELLITUS WITHOUT COMPLICATION, WITHOUT LONG-TERM CURRENT USE OF INSULIN: Primary | ICD-10-CM

## 2025-06-20 DIAGNOSIS — I10 PRIMARY HYPERTENSION: ICD-10-CM

## 2025-06-20 DIAGNOSIS — Z12.5 PROSTATE CANCER SCREENING: ICD-10-CM

## 2025-06-22 LAB
25(OH)D3+25(OH)D2 SERPL-MCNC: 32 NG/ML (ref 30–100)
ALBUMIN SERPL-MCNC: 4.7 G/DL (ref 3.6–5.1)
ALP SERPL-CCNC: 87 U/L (ref 35–144)
ALT SERPL-CCNC: 18 U/L (ref 9–46)
ANION GAP SERPL CALCULATED.4IONS-SCNC: 7 MMOL/L (CALC) (ref 7–17)
AST SERPL-CCNC: 14 U/L (ref 10–35)
BASOPHILS # BLD AUTO: 37 CELLS/UL (ref 0–200)
BASOPHILS NFR BLD AUTO: 0.6 %
BILIRUB SERPL-MCNC: 1.5 MG/DL (ref 0.2–1.2)
BUN SERPL-MCNC: 13 MG/DL (ref 7–25)
CALCIUM SERPL-MCNC: 9.7 MG/DL (ref 8.6–10.3)
CHLORIDE SERPL-SCNC: 102 MMOL/L (ref 98–110)
CHOLEST SERPL-MCNC: 132 MG/DL
CHOLEST/HDLC SERPL: 2.4 (CALC)
CO2 SERPL-SCNC: 31 MMOL/L (ref 20–32)
CREAT SERPL-MCNC: 0.8 MG/DL (ref 0.7–1.3)
EGFRCR SERPLBLD CKD-EPI 2021: 104 ML/MIN/1.73M2
EOSINOPHIL # BLD AUTO: 167 CELLS/UL (ref 15–500)
EOSINOPHIL NFR BLD AUTO: 2.7 %
ERYTHROCYTE [DISTWIDTH] IN BLOOD BY AUTOMATED COUNT: 12.6 % (ref 11–15)
EST. AVERAGE GLUCOSE BLD GHB EST-MCNC: NORMAL MG/DL
EST. AVERAGE GLUCOSE BLD GHB EST-SCNC: NORMAL MMOL/L
GLUCOSE SERPL-MCNC: 194 MG/DL (ref 65–139)
HBA1C MFR BLD: NORMAL %
HCT VFR BLD AUTO: 49.2 % (ref 38.5–50)
HDLC SERPL-MCNC: 54 MG/DL
HGB BLD-MCNC: 16.7 G/DL (ref 13.2–17.1)
LDLC SERPL CALC-MCNC: 61 MG/DL (CALC)
LYMPHOCYTES # BLD AUTO: 2027 CELLS/UL (ref 850–3900)
LYMPHOCYTES NFR BLD AUTO: 32.7 %
MAGNESIUM SERPL-MCNC: 2.1 MG/DL (ref 1.5–2.5)
MCH RBC QN AUTO: 30.4 PG (ref 27–33)
MCHC RBC AUTO-ENTMCNC: 33.9 G/DL (ref 32–36)
MCV RBC AUTO: 89.6 FL (ref 80–100)
MONOCYTES # BLD AUTO: 502 CELLS/UL (ref 200–950)
MONOCYTES NFR BLD AUTO: 8.1 %
NEUTROPHILS # BLD AUTO: 3466 CELLS/UL (ref 1500–7800)
NEUTROPHILS NFR BLD AUTO: 55.9 %
NONHDLC SERPL-MCNC: 78 MG/DL (CALC)
PHOSPHATE SERPL-MCNC: 3.3 MG/DL (ref 2.5–4.5)
PLATELET # BLD AUTO: 281 THOUSAND/UL (ref 140–400)
PMV BLD REES-ECKER: 9.5 FL (ref 7.5–12.5)
POTASSIUM SERPL-SCNC: 4.1 MMOL/L (ref 3.5–5.3)
PROT SERPL-MCNC: 7 G/DL (ref 6.1–8.1)
PSA FREE MFR SERPL: NORMAL %
PSA FREE SERPL-MCNC: NORMAL NG/ML
PSA SERPL-MCNC: NORMAL NG/ML
RBC # BLD AUTO: 5.49 MILLION/UL (ref 4.2–5.8)
SODIUM SERPL-SCNC: 140 MMOL/L (ref 135–146)
TRIGL SERPL-MCNC: 89 MG/DL
TSH SERPL-ACNC: 0.52 MIU/L (ref 0.4–4.5)
VIT B12 SERPL-MCNC: 359 PG/ML (ref 200–1100)
WBC # BLD AUTO: 6.2 THOUSAND/UL (ref 3.8–10.8)

## 2025-06-23 ENCOUNTER — OFFICE VISIT (OUTPATIENT)
Dept: ORTHOPEDIC SURGERY | Facility: HOSPITAL | Age: 55
End: 2025-06-23
Payer: COMMERCIAL

## 2025-06-23 DIAGNOSIS — M75.01 ADHESIVE CAPSULITIS OF RIGHT SHOULDER: Primary | ICD-10-CM

## 2025-06-23 LAB
ALBUMIN/CREAT UR: 6 MG/G CREAT
CREAT UR-MCNC: 51 MG/DL (ref 20–320)
MICROALBUMIN UR-MCNC: 0.3 MG/DL

## 2025-06-23 PROCEDURE — 4010F ACE/ARB THERAPY RXD/TAKEN: CPT | Performed by: ORTHOPAEDIC SURGERY

## 2025-06-23 PROCEDURE — 99212 OFFICE O/P EST SF 10 MIN: CPT | Performed by: ORTHOPAEDIC SURGERY

## 2025-06-23 PROCEDURE — RXMED WILLOW AMBULATORY MEDICATION CHARGE

## 2025-06-23 RX ORDER — MELOXICAM 15 MG/1
15 TABLET ORAL DAILY
Qty: 10 TABLET | Refills: 0 | Status: SHIPPED | OUTPATIENT
Start: 2025-06-23 | End: 2025-07-08

## 2025-06-23 ASSESSMENT — PAIN - FUNCTIONAL ASSESSMENT: PAIN_FUNCTIONAL_ASSESSMENT: 0-10

## 2025-06-23 ASSESSMENT — PAIN SCALES - GENERAL: PAINLEVEL_OUTOF10: 6

## 2025-06-23 NOTE — PROGRESS NOTES
Patient feels he did benefit from the injection he is a little bit better with regard to his shoulder function he was exercising a lot at work today and has some pain.  He has not started formal therapy yet.    On exam elevation 150 external rotation and abduction 80 internal rotation and abduction 50 degrees with pain on external rotation abduction.  Internal rotation at the side is to posterior iliac crest.  There is pain at the top of the elevation arc.    The patient did have blood work done but the hemoglobin A1c is not visible on his chart yet    Adhesive capsulitis    The patient has made some progress I would like him to continue with an anti-inflammatory medicine and physical therapy time to to coincide.  I would like to check him after 2-3 of therapy sessions.    This was dictated using voice recognition software and not corrected for grammatical or spelling errors.

## 2025-06-24 LAB
25(OH)D3+25(OH)D2 SERPL-MCNC: 32 NG/ML (ref 30–100)
ALBUMIN SERPL-MCNC: 4.7 G/DL (ref 3.6–5.1)
ALP SERPL-CCNC: 87 U/L (ref 35–144)
ALT SERPL-CCNC: 18 U/L (ref 9–46)
ANION GAP SERPL CALCULATED.4IONS-SCNC: 7 MMOL/L (CALC) (ref 7–17)
AST SERPL-CCNC: 14 U/L (ref 10–35)
BASOPHILS # BLD AUTO: 37 CELLS/UL (ref 0–200)
BASOPHILS NFR BLD AUTO: 0.6 %
BILIRUB SERPL-MCNC: 1.5 MG/DL (ref 0.2–1.2)
BUN SERPL-MCNC: 13 MG/DL (ref 7–25)
CALCIUM SERPL-MCNC: 9.7 MG/DL (ref 8.6–10.3)
CHLORIDE SERPL-SCNC: 102 MMOL/L (ref 98–110)
CHOLEST SERPL-MCNC: 132 MG/DL
CHOLEST/HDLC SERPL: 2.4 (CALC)
CO2 SERPL-SCNC: 31 MMOL/L (ref 20–32)
CREAT SERPL-MCNC: 0.8 MG/DL (ref 0.7–1.3)
EGFRCR SERPLBLD CKD-EPI 2021: 104 ML/MIN/1.73M2
EOSINOPHIL # BLD AUTO: 167 CELLS/UL (ref 15–500)
EOSINOPHIL NFR BLD AUTO: 2.7 %
ERYTHROCYTE [DISTWIDTH] IN BLOOD BY AUTOMATED COUNT: 12.6 % (ref 11–15)
EST. AVERAGE GLUCOSE BLD GHB EST-MCNC: 217 MG/DL
EST. AVERAGE GLUCOSE BLD GHB EST-SCNC: 12 MMOL/L
GLUCOSE SERPL-MCNC: 194 MG/DL (ref 65–139)
HBA1C MFR BLD: 9.2 %
HCT VFR BLD AUTO: 49.2 % (ref 38.5–50)
HDLC SERPL-MCNC: 54 MG/DL
HGB BLD-MCNC: 16.7 G/DL (ref 13.2–17.1)
LDLC SERPL CALC-MCNC: 61 MG/DL (CALC)
LYMPHOCYTES # BLD AUTO: 2027 CELLS/UL (ref 850–3900)
LYMPHOCYTES NFR BLD AUTO: 32.7 %
MAGNESIUM SERPL-MCNC: 2.1 MG/DL (ref 1.5–2.5)
MCH RBC QN AUTO: 30.4 PG (ref 27–33)
MCHC RBC AUTO-ENTMCNC: 33.9 G/DL (ref 32–36)
MCV RBC AUTO: 89.6 FL (ref 80–100)
MONOCYTES # BLD AUTO: 502 CELLS/UL (ref 200–950)
MONOCYTES NFR BLD AUTO: 8.1 %
NEUTROPHILS # BLD AUTO: 3466 CELLS/UL (ref 1500–7800)
NEUTROPHILS NFR BLD AUTO: 55.9 %
NONHDLC SERPL-MCNC: 78 MG/DL (CALC)
PHOSPHATE SERPL-MCNC: 3.3 MG/DL (ref 2.5–4.5)
PLATELET # BLD AUTO: 281 THOUSAND/UL (ref 140–400)
PMV BLD REES-ECKER: 9.5 FL (ref 7.5–12.5)
POTASSIUM SERPL-SCNC: 4.1 MMOL/L (ref 3.5–5.3)
PROT SERPL-MCNC: 7 G/DL (ref 6.1–8.1)
PSA FREE MFR SERPL: 28 % (CALC)
PSA FREE SERPL-MCNC: 0.5 NG/ML
PSA SERPL-MCNC: 1.8 NG/ML
RBC # BLD AUTO: 5.49 MILLION/UL (ref 4.2–5.8)
SODIUM SERPL-SCNC: 140 MMOL/L (ref 135–146)
TRIGL SERPL-MCNC: 89 MG/DL
TSH SERPL-ACNC: 0.52 MIU/L (ref 0.4–4.5)
VIT B12 SERPL-MCNC: 359 PG/ML (ref 200–1100)
WBC # BLD AUTO: 6.2 THOUSAND/UL (ref 3.8–10.8)

## 2025-06-28 ENCOUNTER — PHARMACY VISIT (OUTPATIENT)
Dept: PHARMACY | Facility: CLINIC | Age: 55
End: 2025-06-28
Payer: COMMERCIAL

## 2025-06-30 ENCOUNTER — EVALUATION (OUTPATIENT)
Dept: PHYSICAL THERAPY | Facility: CLINIC | Age: 55
End: 2025-06-30
Payer: COMMERCIAL

## 2025-06-30 DIAGNOSIS — M75.01 ADHESIVE CAPSULITIS OF RIGHT SHOULDER: ICD-10-CM

## 2025-06-30 PROCEDURE — 97161 PT EVAL LOW COMPLEX 20 MIN: CPT | Mod: GP | Performed by: PHYSICAL THERAPIST

## 2025-06-30 PROCEDURE — 97110 THERAPEUTIC EXERCISES: CPT | Mod: GP | Performed by: PHYSICAL THERAPIST

## 2025-06-30 ASSESSMENT — ENCOUNTER SYMPTOMS
DEPRESSION: 0
LOSS OF SENSATION IN FEET: 0
OCCASIONAL FEELINGS OF UNSTEADINESS: 0

## 2025-06-30 NOTE — PROGRESS NOTES
Physical Therapy Evaluation    Patient Name: Joe Cruz  MRN: [id number]  Today's Date: 6/30/2025  Time Calculation  Start Time: 1540  Stop Time: 1620  Time Calculation (min): 40 min  PT Evaluation Time Entry  PT Evaluation (Low) Time Entry: 25  PT Therapeutic Procedures Time Entry  Therapeutic Exercise Time Entry: 15        Insurance: [employer], authorization required  Plan of Care:  Visit #1    Referring MD Angel Jiang  Imaging Performed MRI: Findings suggestive of adhesive capsulitis. Small amount of partial-thickness articular surface tearing distal supraspinatus tendon without evidence of full-thickness rotator cuff tear. Mild acromioclavicular arthrosis.    Assessment     Joe Cruz is a 54 y.o. referred for R shoulder pain and decreased ROM. Patient demonstrates decreased ROM, strength, and joint mobility. At this time, patient is limited with reaching overhead and work activity. Patient will benefit from physical therapy services to address stated impairments, improve functional mobility, and establish HEP.    Plan  Treatment/Interventions: Dry needling, Education/ Instruction, Hot pack, Manual therapy, Neuromuscular re-education, Therapeutic activities, Therapeutic exercises, Vasopneumatic device  PT Plan: Skilled PT  PT Frequency: 1 time per week  Duration: 8 weeks    Primary diagnosis: Adhesive capsulitis  Current Problem  1. Adhesive capsulitis of right shoulder  Referral to Physical Therapy    Follow Up In Physical Therapy          General:  General  Reason for Referral: R shoulder pain  Precautions:         Subjective:    Pt reports to PT this date with complaints of R shoulder pain and limited motion. Pt reports pain began in January 2025 with insidious onset. He underwent MRI and Xray.  He underwent injection on 6/2/25 which helped with both pain and motion. Pt was also prescribed Meloxicam, took it for the first time today as he understood to take the medication on days that he has PT. Pt  "denies N/T but does get radiating pain with certain motions.     Chief complaints: R shoulder pain  Onset/Surgery Date: January 2025  Mechanism of Injury: insidious onset  Previous History: none  Personal Factors that may impact care: diabetes     Pain:  Current: 2/10 Best: 0/10 Worst: 5/10   Location: anterior aspect of the shoulder and tricep region   Type: dull and achey with intermittent sharp pains   Aggravators: reaching behind back, reaching over head, pushing, pulling, dressing, donning/doffing shirt   Alleviators: injection and in the past icy hot   Numbness/tingling? no    Function:   Work/Recreation:    Prior Level: no restrictions   Current limitations: full work with pain   Condition: Improving          Sleep:    Getting to sleep no issues    Disturbed yes, if roll onto R        Goals for Therapy:    \"To move my arm fully\"    Objective     Objective:    Posture: forward head, rounded shoulders    Palpation: unremarkable    R hand dominant    ROM/Flexibility:    Shoulder Flexion R / L:             120 / 140      Extension R / L :        60 / 80      Abduction R / L :       88 / 165      Functional ER R / L:   T1 / T3      Functional IR R / L:    T9 / back pocket     Strength R / L :       Shoulder Flexion:      4 / 5    Shoulder Abduction:        4 / 5    Shoulder ER:                     4- / 5    Shoulder IR:                      4+ / 5           Special Tests R / L : deferred         Outcome Measure:    QuickDASH: 22.5 %       Treatment:  Access Code [email or web address]  Supine AAROM: flexion and ER  Standing AAROM: abduction and ext      Goals:  Active       PT Problem       Pt to improve R shoulder flexion to >130       Start:  06/30/25    Expected End:  08/29/25            Pt to improve shoulder abduction to >115       Start:  06/30/25    Expected End:  08/29/25            Pt to increase R shoulder strength by 1/2 grade in all planes       Start:  06/30/25    Expected End:  08/29/25            Pt to " decrease QuickDash from 22.5 to <15       Start:  25    Expected End:  25                       Date of Evaluation: 25    Onset Date: 2025    Referring Physician: Angel Jiang     Surgery in the Last 3 months:  no    CPT Codes: Therapeutic Exercise: 61250, Therapeutic Activity: 73283, Neuromuscular Re-Education: 49966, Manual Therapy: 06427, Self-Care/Home Management Trainin, and PT Re-Evaluation: 40204     Diagnosis:   Problem List Items Addressed This Visit           ICD-10-CM    Adhesive capsulitis of right shoulder M75.01    Relevant Orders    Follow Up In Physical Therapy          Functional Outcome:  Other Measures  Disability of Arm Shoulder Hand (DASH): 22.5    OT / PT Evaluation complexity:  low    Which of the following best describes the primary reason of therapy: Improving, restoring, or adapting functional mobility or skills    Visits Requested: 10    Date Range: 90 days    Select all conditions that apply: Diabetes Mellitus         Ashly Balderas, PT

## 2025-07-11 PROCEDURE — RXMED WILLOW AMBULATORY MEDICATION CHARGE

## 2025-07-14 ENCOUNTER — TREATMENT (OUTPATIENT)
Dept: PHYSICAL THERAPY | Facility: CLINIC | Age: 55
End: 2025-07-14
Payer: COMMERCIAL

## 2025-07-14 DIAGNOSIS — M75.01 ADHESIVE CAPSULITIS OF RIGHT SHOULDER: ICD-10-CM

## 2025-07-14 PROCEDURE — 97140 MANUAL THERAPY 1/> REGIONS: CPT | Mod: GP | Performed by: PHYSICAL THERAPIST

## 2025-07-14 PROCEDURE — 97110 THERAPEUTIC EXERCISES: CPT | Mod: GP | Performed by: PHYSICAL THERAPIST

## 2025-07-14 NOTE — PROGRESS NOTES
"Physical Therapy Treatment    Patient Name:Joe Cruz  MRN:[id number]  Today's Date:7/14/2025  Referred by: Angel Jiang  Time Calculation  Start Time: 0445  Stop Time: 0524  Time Calculation (min): 39 min    Therapy Diagnosis  Problem List Items Addressed This Visit           ICD-10-CM    Adhesive capsulitis of right shoulder M75.01     Insurance  Visit number: 2  Auth required: No  Onset Date: 1/1/2025    Payor: AARON / Plan: [employer] / Product Type: *No Product type* /     Precautions/Fall Risk:      Subjective/Pain:  Reports shoulder is \"okay\" notes he is making more of an effort to use R UE throughout the day    HEP Compliance: Good    Objective/Outcome Measures:    Treatment  Therapeutic Procedure   Access Code [email or web address]    UBE 2'/2'  Supine AAROM: flexion and ER 2x10  Standing AAROM: abduction and ext 2x10  Tband rows, grn 2x10      Manual Therapy x25'  PROM shoulder flexion, scaption, abduction, and ER  STM deltoid, biceps, triceps, and lat      Assessment:     Continued ROM restrictions noted in all planes. Moderate soft tissue tightness noted in lat, decreased with manual intervention.  Pt tolerated addition of UBE and tband rows without complaint, will continue to progress as able. Intermittent cues required for proper execution and to avoid UT compensation.       Plan for next session: continue to focus on ROM and mobility, progress as able.     Goals:  Active         PT Problem         Pt to improve R shoulder flexion to >130         Start:  06/30/25    Expected End:  08/29/25              Pt to improve shoulder abduction to >115         Start:  06/30/25    Expected End:  08/29/25              Pt to increase R shoulder strength by 1/2 grade in all planes         Start:  06/30/25    Expected End:  08/29/25              Pt to decrease QuickDash from 22.5 to <15         Start:  06/30/25    Expected End:  08/29/25             "

## 2025-07-21 ENCOUNTER — TREATMENT (OUTPATIENT)
Dept: PHYSICAL THERAPY | Facility: CLINIC | Age: 55
End: 2025-07-21
Payer: COMMERCIAL

## 2025-07-21 DIAGNOSIS — M75.01 ADHESIVE CAPSULITIS OF RIGHT SHOULDER: ICD-10-CM

## 2025-07-21 PROCEDURE — RXMED WILLOW AMBULATORY MEDICATION CHARGE

## 2025-07-21 PROCEDURE — 97140 MANUAL THERAPY 1/> REGIONS: CPT | Mod: GP | Performed by: PHYSICAL THERAPIST

## 2025-07-21 PROCEDURE — 97110 THERAPEUTIC EXERCISES: CPT | Mod: GP | Performed by: PHYSICAL THERAPIST

## 2025-07-21 NOTE — PROGRESS NOTES
"Physical Therapy Treatment    Patient Name:Joe Cruz  MRN:[id number]  Today's Date:7/21/2025  Referred by: Angel Jiang  Time Calculation  Start Time: 0350  Stop Time: 0429  Time Calculation (min): 39 min    Therapy Diagnosis  Problem List Items Addressed This Visit           ICD-10-CM    Adhesive capsulitis of right shoulder M75.01     Insurance  Visit number: 3  Auth required: No  Onset Date: 1/1/2025    Payor: AARON / Plan: [employer]M [employer]MP / Product Type: *No Product type* /     Precautions/Fall Risk:      Subjective/Pain:  Reports shoulder soreness following last visit, lasting about 1 day.     HEP Compliance: Good    Objective/Outcome Measures:  R shoulder flexion: 125 (improved from 120)  R shoulder abd 135 (improved from 88)    Treatment  Therapeutic Procedure   Access Code [email or web address]    UBE 2'/2'  Supine AAROM: flexion and ER 2x10  Standing AAROM: abduction and ext 2x10  Wall slides: flexion x10  Tband rows, grn 2x10  Tband ext, grn 2x10  Standing shoulder flexion 2x10  Doorway stretch 3x20\"    Manual Therapy x15'  PROM shoulder flexion, scaption, abduction, and ER  STM  lat      Assessment:     Continued ROM restrictions noted in all planes. Moderate soft tissue tightness noted in lat, decreased with manual intervention.  Improved shoulder flexion and abd ROM noted following PROM and AAROM exercises.     Plan for next session: continue to focus on ROM and mobility, progress as able. Add shoulder scaption next visit    Goals:  Active         PT Problem         Pt to improve R shoulder flexion to >130         Start:  06/30/25    Expected End:  08/29/25              Pt to improve shoulder abduction to >115         Start:  06/30/25    Expected End:  08/29/25              Pt to increase R shoulder strength by 1/2 grade in all planes         Start:  06/30/25    Expected End:  08/29/25              Pt to decrease QuickDash from 22.5 to <15         Start:  06/30/25    Expected End:  08/29/25             "

## 2025-07-22 ENCOUNTER — PHARMACY VISIT (OUTPATIENT)
Dept: PHARMACY | Facility: CLINIC | Age: 55
End: 2025-07-22
Payer: COMMERCIAL

## 2025-07-24 ENCOUNTER — APPOINTMENT (OUTPATIENT)
Dept: PRIMARY CARE | Facility: CLINIC | Age: 55
End: 2025-07-24
Payer: COMMERCIAL

## 2025-07-24 VITALS
DIASTOLIC BLOOD PRESSURE: 84 MMHG | BODY MASS INDEX: 27.07 KG/M2 | OXYGEN SATURATION: 95 % | SYSTOLIC BLOOD PRESSURE: 125 MMHG | HEART RATE: 96 BPM | WEIGHT: 199.6 LBS

## 2025-07-24 DIAGNOSIS — R40.0 DAYTIME SOMNOLENCE: ICD-10-CM

## 2025-07-24 DIAGNOSIS — Z23 IMMUNIZATION DUE: ICD-10-CM

## 2025-07-24 DIAGNOSIS — E11.42 DIABETIC POLYNEUROPATHY ASSOCIATED WITH TYPE 2 DIABETES MELLITUS: ICD-10-CM

## 2025-07-24 DIAGNOSIS — E11.69 TYPE 2 DIABETES MELLITUS WITH OTHER SPECIFIED COMPLICATION, WITHOUT LONG-TERM CURRENT USE OF INSULIN: ICD-10-CM

## 2025-07-24 DIAGNOSIS — E78.5 DYSLIPIDEMIA: ICD-10-CM

## 2025-07-24 DIAGNOSIS — E11.65 POORLY CONTROLLED DIABETES MELLITUS (MULTI): ICD-10-CM

## 2025-07-24 DIAGNOSIS — E11.9 TYPE 2 DIABETES MELLITUS WITHOUT COMPLICATION, WITHOUT LONG-TERM CURRENT USE OF INSULIN: ICD-10-CM

## 2025-07-24 DIAGNOSIS — Z12.11 COLON CANCER SCREENING: ICD-10-CM

## 2025-07-24 DIAGNOSIS — Z00.00 HEALTH MAINTENANCE EXAMINATION: Primary | ICD-10-CM

## 2025-07-24 PROCEDURE — 99396 PREV VISIT EST AGE 40-64: CPT | Performed by: STUDENT IN AN ORGANIZED HEALTH CARE EDUCATION/TRAINING PROGRAM

## 2025-07-24 PROCEDURE — 3079F DIAST BP 80-89 MM HG: CPT | Performed by: STUDENT IN AN ORGANIZED HEALTH CARE EDUCATION/TRAINING PROGRAM

## 2025-07-24 PROCEDURE — 1036F TOBACCO NON-USER: CPT | Performed by: STUDENT IN AN ORGANIZED HEALTH CARE EDUCATION/TRAINING PROGRAM

## 2025-07-24 PROCEDURE — 90677 PCV20 VACCINE IM: CPT | Performed by: STUDENT IN AN ORGANIZED HEALTH CARE EDUCATION/TRAINING PROGRAM

## 2025-07-24 PROCEDURE — 3074F SYST BP LT 130 MM HG: CPT | Performed by: STUDENT IN AN ORGANIZED HEALTH CARE EDUCATION/TRAINING PROGRAM

## 2025-07-24 PROCEDURE — 4010F ACE/ARB THERAPY RXD/TAKEN: CPT | Performed by: STUDENT IN AN ORGANIZED HEALTH CARE EDUCATION/TRAINING PROGRAM

## 2025-07-24 PROCEDURE — 90471 IMMUNIZATION ADMIN: CPT | Performed by: STUDENT IN AN ORGANIZED HEALTH CARE EDUCATION/TRAINING PROGRAM

## 2025-07-24 RX ORDER — TIRZEPATIDE 5 MG/.5ML
5 INJECTION, SOLUTION SUBCUTANEOUS WEEKLY
Qty: 2 ML | Refills: 0 | Status: SHIPPED | OUTPATIENT
Start: 2025-07-24

## 2025-07-24 ASSESSMENT — PATIENT HEALTH QUESTIONNAIRE - PHQ9
1. LITTLE INTEREST OR PLEASURE IN DOING THINGS: NOT AT ALL
SUM OF ALL RESPONSES TO PHQ9 QUESTIONS 1 AND 2: 0
2. FEELING DOWN, DEPRESSED OR HOPELESS: NOT AT ALL

## 2025-07-24 ASSESSMENT — PAIN SCALES - GENERAL: PAINLEVEL_OUTOF10: 0-NO PAIN

## 2025-07-24 ASSESSMENT — ENCOUNTER SYMPTOMS
OCCASIONAL FEELINGS OF UNSTEADINESS: 0
DEPRESSION: 0
LOSS OF SENSATION IN FEET: 0

## 2025-07-24 NOTE — PROGRESS NOTES
Joe Cruz is a 54 y.o. male [place] [facility] [place] Dam[place] on 07/24/25 for routine care, as well as for management of the following chronic medical conditions: HTN, DLD, T2DM. He presents today for CPE. He is overdue for follow up, main issue is uncontrolled T2DM and consequences related to that (I.e. adhesive capsulitis).     Updates:   - stable weight right around 200 lbs   - labs 06/2025 reviewed: hyperglycemia (194), A1C 9.2%, LDL at goal (61; goal <70)  - current regiment: Atorva 40, Lisinopril 10 (reassuring urine albumin:cr ratio and renal function), Rybelsus 14mg daily  - BP at goal in office today   [  ] c-peptide, antibodies (IA2, SALO) with labs today to assess for possible evolution/future prediction for insulin and FIONA like presentation   [  ] pharmacy referral  [  ] transition from Rybelsus to Mounjaro with plans for titration  - prior Metformin intolerance  - no urgent indications for SGLT-2 at this time; consider addition to regimen in future   - insulin consideration pending labs as above   [  ] optho referral   [  ] lipids at goal on current statin; CAC scoring to further risk stratify   [  ] continue statin, ACE-I  [  ] engaged and follows with podiatry   [  ] PT and ortho follow up related to Adhesive Capsulitis   [  ] STOP BANG iso male gender, neck circumference >40cm, HTN on treatment, and age >50 is 4 placing him at high risk for NESSA; this in conjunction with his elevated diastolic BP and relative polycythemia prompted discussion regarding home sleep testing, which he is interested and agreeable to given some concurrent, mild daytime somnolence     Past Medical History: as above   Past Medical History:   Diagnosis Date    Hyperlipidemia     Hypertension     Other specified health status     No pertinent past medical history    Type 2 diabetes mellitus      Subspecialty Medical Care: Orthopedics, Podiatry, Physical Therapy, Optho referral today     Past Surgical History:  prior ENT surgery (deviated septum)   Past Surgical History:   Procedure Laterality Date    OTHER SURGICAL HISTORY  12/05/2019    No history of surgery     Medications:   Current Outpatient Medications:     atorvastatin (Lipitor) 40 mg tablet, Take 1 tablet (40 mg) by mouth once daily., Disp: 90 tablet, Rfl: 1    ibuprofen 600 mg tablet, Take 1 tablet (600 mg) by mouth every 6 hours if needed for moderate pain (4 - 6) for up to 20 doses., Disp: 20 tablet, Rfl: 0    lisinopril 10 mg tablet, Take 1 tablet (10 mg) by mouth once daily., Disp: 90 tablet, Rfl: 3    semaglutide (Rybelsus) 14 mg tablet tablet, Take 1 tablet (14 mg) by mouth once daily., Disp: 90 tablet, Rfl: 0    bacitracin 500 unit/gram ointment, Apply topically 2 times a day., Disp: 14 g, Rfl: 0    Dexcom G6  misc, Use as instructed (Patient not taking: Reported on 7/24/2025), Disp: 1 each, Rfl: 0    FreeStyle Deana 3 Sensor device, Use to monitor blood sugar for 14 days (Patient not taking: Reported on 7/24/2025), Disp: 3 each, Rfl: 3  Pharmacy: [facility]    Allergies: PCN (as child; may consider challenge if necessary)   Allergies   Allergen Reactions    Penicillins Other and Unknown     Unknown reaction     Immunizations:   - Tdap 2017-->2027  - Flu UTD 2023  - COVID booster UTD 2023    - Shingrix recommended but denies ever having varicella as child   - PCV-20 today     Family History:   - Mother with COPD, lifelong smoker   - Father with CAD   - Brother with HEENT cancer, smoking/alcohol use   No family history on file.    Social History:   Home/Living Situation/Falls/Safety Assessment: lives alone   Education/Employment/Work/Vocational:   Activities: had been working out regularly in the past   Drug Use: former smoker (quit in 2008, from age [age])  Diet: dietary counseling discussed; mostly eats out   Depression/Anxiety: no mood concerns   Sexuality/Contraception/Menstrual History: defers   Sleep: no sleep concerns, rises at 4:45AM daily       Patient Information:  Health Insurance: has insurance   Transportation: drives   Healthcare POA/Guardian: emergency contact, brother [phone]  Contact Information: [phone]    Visit Vitals  /84 (BP Location: Right arm, Patient Position: Sitting, BP Cuff Size: Large adult)   Pulse 96   Wt 90.5 kg (199 lb 9.6 oz)   SpO2 95%   BMI 27.07 kg/m²   Smoking Status Never   BSA 2.14 m²      PHYSICAL EXAM:   General: well appearing  male, NAD, pleasant and engaged in encounter    HEENT: NCAT, MMM  CV: RRR, no m/r/g  PULM: CTAB, non-labored respirations   ABD: soft, NT, ND, + bowel sounds   : no suprapubic or CVA tenderness   EXT: WWP, no significant edema   SKIN: no rashes noted   NEURO: A&Ox4, symmetric facies, no gross motor or sensory deficits, normal gait  PSYCH: pleasant mood, appropriate affect     Assessment/Plan    Joe Cruz is a 54 y.o. male seen in [place] at [facility] [place] Damon Baig on 07/24/25 for routine care, as well as for management of the following chronic medical conditions: HTN, DLD, T2DM. He presents today for CPE. He is overdue for follow up, main issue is uncontrolled T2DM and consequences related to that (I.e. adhesive capsulitis).     Updates:   - stable weight right around 200 lbs   - labs 06/2025 reviewed: hyperglycemia (194), A1C 9.2%, LDL at goal (61; goal <70)  - current regiment: Atorva 40, Lisinopril 10 (reassuring urine albumin:cr ratio and renal function), Rybelsus 14mg daily  - BP at goal in office today   [  ] c-peptide, antibodies (IA2, SALO) with labs today to assess for possible evolution/future prediction for insulin and FIONA like presentation   [  ] pharmacy referral  [  ] transition from Rybelsus to Mounjaro with plans for titration  - prior Metformin intolerance  - no urgent indications for SGLT-2 at this time; consider addition to regimen in future   - insulin consideration pending labs as above   [  ] optho referral   [  ] lipids at goal on  current statin; CAC scoring to further risk stratify   [  ] continue statin, ACE-I  [  ] engaged and follows with podiatry   [  ] PT and ortho follow up related to Adhesive Capsulitis   [  ] STOP BANG iso male gender, neck circumference >40cm, HTN on treatment, and age >50 is 4 placing him at high risk for NESSA; this in conjunction with his elevated diastolic BP and relative polycythemia prompted discussion regarding home sleep testing, which he is interested and agreeable to given some concurrent, mild daytime somnolence     #T2DM  - ACE-I: Lisinopril 10mg   - current: Rybelsus; prior Glimepiride, Metformin, Trulicity with intolerances or lacking efficacy   - Last A1C 9.2 in 06/2025   [  ] labs including C-peptide, antibodies   [  ] pharmacy referral   [  ] transition to [facility]   [  ] follow up in 3 months   [  ] optho referral      #DLD  - Atorva 40   - lipid panel with LDL <70 per 06/2025 labs   [  ] CAC scoring to further risk stratify      #HTN  - improved control on 10mg daily dosing   - CMP 06/2025 with reassuring renal function   [  ] NESSA eval with home sleep study as above     #Health Maintenance  CPE: 7/24/2025     Cancer Screening  - Colorectal Cancer Screening: COLOGUARD recommended again, reminded and re-ordered   - Lung Cancer Screening: not candidate, quit >15 years ago    - Prostate Cancer Screening: reassuring 06/2025, repeat annually      Laboratory Screening  - Lipid Screen: at goal on Atorva 40  - A1C, glucose screen: 9.2%  - STI, HIV, Hep B screen: defers  - Hep C screen: defers     Imaging Screening  - AAA screening: due at 65      Immunizations  - Influenza: annual advised  - COVID: recommend staying UTD   - Tdap: UTD through 2027  - Prevnar, Pneumovax: given today   - Shingrix: recommended   - MMR: discuss at subsequent visits, consider titers      Other Screening  - Health Literacy Assessment: adequate  - Depression screen: NEGATIVE   - Home safety/partner violence screen: NEGATIVE   -  Hearing/Vision screens: optho recommended given T2DM   - Alcohol/tobacco/drug use screen: former smoker   - Healthcare POA/Advanced Directives: Brother     RTC in 3 months for close follow up.     Patient Discussion:    Please call back the office with any questions at [phone]. In the case of an emergency, please call 911 or go to the nearest Emergency Department.      Damon Baig MD  Internal Medicine-Pediatrics  [facility] [place] [address], [address]  P: [phone], F: [phone]

## 2025-07-24 NOTE — PATIENT INSTRUCTIONS
Labs today in [address] to further investigate/understand your diabetes (OK if not fasting)    Testing Recommendations :  - CT Cardiac Calcium Scoring: heart test, call [phone], helps predict heart attack risk  - Home Sleep Study: they should reach out to you; call referral scheduling if you do not hear anything, [phone]  - Cologuard: stool test to assess for colon cancer, should be mailed to your home     Referrals:   - Pharmacy team: Laurita Garcia, will help with medication transition from [facility] to Mounjaro; script sent to [facility] pharmacy to help with approval process  - Ophthalmology: referral to Dr. Elliott in this office at [place] ([phone])    Pneumonia vaccine today in office    Follow up in three months to review everything!    Best,  Dr. JAY

## 2025-07-27 LAB
C PEPTIDE SERPL-MCNC: 2.99 NG/ML (ref 0.8–3.85)
GAD65 AB SER IA-ACNC: <5 IU/ML
ISLET CELL512 AB SER IA-ACNC: NORMAL

## 2025-07-28 ENCOUNTER — APPOINTMENT (OUTPATIENT)
Dept: PHYSICAL THERAPY | Facility: CLINIC | Age: 55
End: 2025-07-28
Payer: COMMERCIAL

## 2025-07-28 ENCOUNTER — DOCUMENTATION (OUTPATIENT)
Dept: PHYSICAL THERAPY | Facility: CLINIC | Age: 55
End: 2025-07-28
Payer: COMMERCIAL

## 2025-07-28 NOTE — PROGRESS NOTES
Physical Therapy                 Therapy Communication Note    Patient Name: Joe Cruz  MRN: [id number]  Department:   Room: Room/bed info not found  Today's Date: 7/28/2025     Discipline: Physical Therapy    Missed Visit:       Missed Visit Reason:      Missed Time: No Show    Comment:

## 2025-08-02 LAB
C PEPTIDE SERPL-MCNC: 2.99 NG/ML (ref 0.8–3.85)
GAD65 AB SER IA-ACNC: <5 IU/ML
ISLET CELL512 AB SER IA-ACNC: <5.4 U/ML

## 2025-08-04 ENCOUNTER — TREATMENT (OUTPATIENT)
Dept: PHYSICAL THERAPY | Facility: CLINIC | Age: 55
End: 2025-08-04
Payer: COMMERCIAL

## 2025-08-04 ENCOUNTER — APPOINTMENT (OUTPATIENT)
Dept: PHYSICAL THERAPY | Facility: CLINIC | Age: 55
End: 2025-08-04
Payer: COMMERCIAL

## 2025-08-04 ENCOUNTER — CLINICAL SUPPORT (OUTPATIENT)
Dept: SLEEP MEDICINE | Facility: HOSPITAL | Age: 55
End: 2025-08-04
Payer: COMMERCIAL

## 2025-08-04 DIAGNOSIS — M75.01 ADHESIVE CAPSULITIS OF RIGHT SHOULDER: ICD-10-CM

## 2025-08-04 DIAGNOSIS — R40.0 DAYTIME SOMNOLENCE: ICD-10-CM

## 2025-08-04 PROCEDURE — 97140 MANUAL THERAPY 1/> REGIONS: CPT | Mod: GP | Performed by: PHYSICAL THERAPIST

## 2025-08-04 PROCEDURE — 97110 THERAPEUTIC EXERCISES: CPT | Mod: GP | Performed by: PHYSICAL THERAPIST

## 2025-08-04 PROCEDURE — 95806 SLEEP STUDY UNATT&RESP EFFT: CPT | Performed by: ALLERGY & IMMUNOLOGY

## 2025-08-04 NOTE — PROGRESS NOTES
"Physical Therapy Treatment    Patient Name:Joe Cruz  MRN:[id number]  Today's Date:8/4/2025  Referred by: Angel Jiang  Time Calculation  Start Time: 1105  Stop Time: 1154  Time Calculation (min): 49 min    Therapy Diagnosis  Problem List Items Addressed This Visit           ICD-10-CM    Adhesive capsulitis of right shoulder M75.01       Insurance  Visit number: 4  Auth required: No  Onset Date: 1/1/2025    Payor: AARON / Plan: [employer]M [employer]MP / Product Type: *No Product type* /     Precautions/Fall Risk:      Subjective/Pain:  Reports slight increase in pain and decreased ROM since last visit.     HEP Compliance: Good    Objective/Outcome Measures:  Functional IR: with L UE assist L1; without assist sacrum -- previously to back pocket    Treatment  Therapeutic Procedure   Access Code [email or web address]    UBE 2'/2'  Pulleys x4'  Supine AAROM: flexion and ER 2x10  Standing AAROM: abduction and ext 2x10  Wall slides: flexion x10  Standing shoulder flexion and scaption 2x10  Towel IR stretch 5x15\"    Held:  Tband rows, grn 2x10  Tband ext, grn 2x10    Manual Therapy x15'  PROM shoulder flexion, scaption, abduction, and ER  STM/IASTM biceps, deltoid, tricep      Assessment:     Pt reports decreased tightness and soreness following used of IASTM today for manual intervention. Pt demonstrates improved functional IR today when assisted with L UE, when performed actively ROM is largely the same as initial evaluation. Continued with therex to improve mobility and function. Continued cues required throughout treatment to maintain upright posture and avoid UT compensation throughout secondary to remaining strength and ROM deficits.     Plan for next session: continue to focus on ROM and mobility, progress as able.     Goals:  Active         PT Problem         Pt to improve R shoulder flexion to >130         Start:  06/30/25    Expected End:  08/29/25              Pt to improve shoulder abduction to >115         Start:  06/30/25    " Expected End:  08/29/25              Pt to increase R shoulder strength by 1/2 grade in all planes         Start:  06/30/25    Expected End:  08/29/25              Pt to decrease QuickDash from 22.5 to <15         Start:  06/30/25    Expected End:  08/29/25

## 2025-08-07 PROCEDURE — RXMED WILLOW AMBULATORY MEDICATION CHARGE

## 2025-08-09 ENCOUNTER — PHARMACY VISIT (OUTPATIENT)
Dept: PHARMACY | Facility: CLINIC | Age: 55
End: 2025-08-09
Payer: COMMERCIAL

## 2025-08-11 ENCOUNTER — TREATMENT (OUTPATIENT)
Dept: PHYSICAL THERAPY | Facility: CLINIC | Age: 55
End: 2025-08-11
Payer: COMMERCIAL

## 2025-08-11 ENCOUNTER — APPOINTMENT (OUTPATIENT)
Dept: PHYSICAL THERAPY | Facility: CLINIC | Age: 55
End: 2025-08-11
Payer: COMMERCIAL

## 2025-08-11 DIAGNOSIS — M75.01 ADHESIVE CAPSULITIS OF RIGHT SHOULDER: ICD-10-CM

## 2025-08-11 PROCEDURE — 97110 THERAPEUTIC EXERCISES: CPT | Mod: GP | Performed by: PHYSICAL THERAPIST

## 2025-08-11 PROCEDURE — 97140 MANUAL THERAPY 1/> REGIONS: CPT | Mod: GP | Performed by: PHYSICAL THERAPIST

## 2025-08-13 ENCOUNTER — RESULTS FOLLOW-UP (OUTPATIENT)
Dept: PRIMARY CARE | Facility: CLINIC | Age: 55
End: 2025-08-13
Payer: COMMERCIAL

## 2025-08-13 DIAGNOSIS — G47.33 SEVERE OBSTRUCTIVE SLEEP APNEA: Primary | ICD-10-CM

## 2025-08-17 LAB — NONINV COLON CA DNA+OCC BLD SCRN STL QL: NORMAL

## 2025-08-18 ENCOUNTER — TREATMENT (OUTPATIENT)
Dept: PHYSICAL THERAPY | Facility: CLINIC | Age: 55
End: 2025-08-18
Payer: COMMERCIAL

## 2025-08-18 ENCOUNTER — APPOINTMENT (OUTPATIENT)
Dept: PHYSICAL THERAPY | Facility: CLINIC | Age: 55
End: 2025-08-18
Payer: COMMERCIAL

## 2025-08-18 DIAGNOSIS — M75.01 ADHESIVE CAPSULITIS OF RIGHT SHOULDER: ICD-10-CM

## 2025-08-18 PROCEDURE — 97140 MANUAL THERAPY 1/> REGIONS: CPT | Mod: GP | Performed by: PHYSICAL THERAPIST

## 2025-08-18 PROCEDURE — 97110 THERAPEUTIC EXERCISES: CPT | Mod: GP | Performed by: PHYSICAL THERAPIST

## 2025-08-19 ENCOUNTER — APPOINTMENT (OUTPATIENT)
Dept: PHARMACY | Facility: HOSPITAL | Age: 55
End: 2025-08-19
Payer: COMMERCIAL

## 2025-08-19 DIAGNOSIS — E78.5 DYSLIPIDEMIA: ICD-10-CM

## 2025-08-19 DIAGNOSIS — E11.69 TYPE 2 DIABETES MELLITUS WITH OTHER SPECIFIED COMPLICATION, WITHOUT LONG-TERM CURRENT USE OF INSULIN: Primary | ICD-10-CM

## 2025-08-19 DIAGNOSIS — E11.9 TYPE 2 DIABETES MELLITUS WITHOUT COMPLICATION, WITHOUT LONG-TERM CURRENT USE OF INSULIN: ICD-10-CM

## 2025-08-19 RX ORDER — VIT C/E/ZN/COPPR/LUTEIN/ZEAXAN 250MG-90MG
25 CAPSULE ORAL DAILY
COMMUNITY

## 2025-08-19 RX ORDER — ATORVASTATIN CALCIUM 40 MG/1
40 TABLET, FILM COATED ORAL DAILY
Qty: 90 TABLET | Refills: 1 | Status: SHIPPED | OUTPATIENT
Start: 2025-08-19 | End: 2026-02-15

## 2025-08-25 ENCOUNTER — TREATMENT (OUTPATIENT)
Dept: PHYSICAL THERAPY | Facility: CLINIC | Age: 55
End: 2025-08-25
Payer: COMMERCIAL

## 2025-08-25 DIAGNOSIS — M75.01 ADHESIVE CAPSULITIS OF RIGHT SHOULDER: ICD-10-CM

## 2025-08-25 PROCEDURE — 97110 THERAPEUTIC EXERCISES: CPT | Mod: GP | Performed by: PHYSICAL THERAPIST

## 2025-08-25 PROCEDURE — 97140 MANUAL THERAPY 1/> REGIONS: CPT | Mod: GP | Performed by: PHYSICAL THERAPIST

## 2025-09-02 ENCOUNTER — APPOINTMENT (OUTPATIENT)
Dept: PHARMACY | Facility: HOSPITAL | Age: 55
End: 2025-09-02
Payer: COMMERCIAL

## 2025-09-04 PROCEDURE — RXMED WILLOW AMBULATORY MEDICATION CHARGE

## 2025-09-06 ENCOUNTER — PHARMACY VISIT (OUTPATIENT)
Dept: PHARMACY | Facility: CLINIC | Age: 55
End: 2025-09-06
Payer: COMMERCIAL

## 2025-09-30 ENCOUNTER — APPOINTMENT (OUTPATIENT)
Dept: PHARMACY | Facility: HOSPITAL | Age: 55
End: 2025-09-30
Payer: COMMERCIAL

## 2025-10-21 ENCOUNTER — APPOINTMENT (OUTPATIENT)
Dept: OPHTHALMOLOGY | Facility: CLINIC | Age: 55
End: 2025-10-21
Payer: COMMERCIAL

## (undated) DEVICE — STRIP, SKIN CLOSURE, STERI STRIP, REINFORCED, 0.5 X 4 IN

## (undated) DEVICE — ADHESIVE, SKIN, LIQUIBAND EXCEED

## (undated) DEVICE — DRAPE, SHEET, LAPAROTOMY, TRANSVERSE, W/FENESTRATION, 77 X 122 IN, DISPOSABLE, LF, STERILE

## (undated) DEVICE — GLOVE, SURGICAL, PROTEXIS PI , 7.0, PF, LF

## (undated) DEVICE — PAD, GROUNDING, ELECTROSURGICAL, W/9 FT CABLE, POLYHESIVE II, ADULT, LF

## (undated) DEVICE — TUBING, SUCTION, 6MM X 10, CLEAN N-COND

## (undated) DEVICE — NEEDLE, HYPODERMIC, 25 G X 1.5 IN, A BEVEL, STERILE

## (undated) DEVICE — SUTURE, MONOCRYL, 4-0, 18 IN, PS2, UNDYED

## (undated) DEVICE — TOWEL, SURGICAL, NEURO, O/R, 16 X 26, BLUE, STERILE

## (undated) DEVICE — PACK, BASIC

## (undated) DEVICE — ELECTRODE, ELECTROSURGICAL, BLADE, INSULATED, ENT/IMA, STERILE

## (undated) DEVICE — APPLICATOR, CHLORAPREP, W/ORANGE TINT, 26ML

## (undated) DEVICE — SUTURE, VICRYL 3-0, PRECISION POINT, PS-2 UNDYED 27 INCH

## (undated) DEVICE — SYRINGE, 10 CC, LUER LOCK

## (undated) DEVICE — SUCTION TIP , YANKAUER, W/BULB SUCTION